# Patient Record
Sex: FEMALE | Race: WHITE | URBAN - METROPOLITAN AREA
[De-identification: names, ages, dates, MRNs, and addresses within clinical notes are randomized per-mention and may not be internally consistent; named-entity substitution may affect disease eponyms.]

---

## 2021-02-13 ENCOUNTER — IMMUNIZATION (OUTPATIENT)
Dept: PRIMARY CARE CLINIC | Age: 81
End: 2021-02-13
Payer: MEDICARE

## 2021-02-13 PROCEDURE — 91300 COVID-19, PFIZER VACCINE 30MCG/0.3ML DOSE: CPT | Performed by: INTERNAL MEDICINE

## 2021-02-13 PROCEDURE — 0001A COVID-19, PFIZER VACCINE 30MCG/0.3ML DOSE: CPT | Performed by: INTERNAL MEDICINE

## 2021-03-09 ENCOUNTER — IMMUNIZATION (OUTPATIENT)
Dept: PRIMARY CARE CLINIC | Age: 81
End: 2021-03-09
Payer: MEDICARE

## 2021-03-09 PROCEDURE — 0002A COVID-19, PFIZER VACCINE 30MCG/0.3ML DOSE: CPT | Performed by: NURSE PRACTITIONER

## 2021-03-09 PROCEDURE — 91300 COVID-19, PFIZER VACCINE 30MCG/0.3ML DOSE: CPT | Performed by: NURSE PRACTITIONER

## 2023-08-15 ENCOUNTER — HOSPITAL ENCOUNTER (OUTPATIENT)
Dept: GENERAL RADIOLOGY | Age: 83
Discharge: HOME | End: 2023-08-17
Payer: MEDICARE

## 2023-08-15 VITALS — WEIGHT: 174 LBS | BODY MASS INDEX: 32.02 KG/M2 | HEIGHT: 62 IN

## 2023-08-15 DIAGNOSIS — Z12.31 VISIT FOR SCREENING MAMMOGRAM: ICD-10-CM

## 2023-08-15 PROCEDURE — 77063 BREAST TOMOSYNTHESIS BI: CPT

## 2024-03-08 ENCOUNTER — TELEPHONE (OUTPATIENT)
Dept: CARDIOLOGY | Facility: HOSPITAL | Age: 84
End: 2024-03-08
Payer: MEDICARE

## 2024-03-08 DIAGNOSIS — I35.0 NONRHEUMATIC AORTIC VALVE STENOSIS: Primary | ICD-10-CM

## 2024-03-13 ENCOUNTER — TELEMEDICINE (OUTPATIENT)
Dept: CARDIOLOGY | Facility: HOSPITAL | Age: 84
End: 2024-03-13
Payer: MEDICARE

## 2024-03-13 DIAGNOSIS — I35.0 NONRHEUMATIC AORTIC VALVE STENOSIS: ICD-10-CM

## 2024-03-13 PROCEDURE — 99204 OFFICE O/P NEW MOD 45 MIN: CPT | Performed by: INTERNAL MEDICINE

## 2024-03-14 DIAGNOSIS — I35.0 NONRHEUMATIC AORTIC VALVE STENOSIS: Primary | ICD-10-CM

## 2024-03-15 NOTE — PROGRESS NOTES
Card[facility]:[place]       HPI: 83-year-old female with past medical history of diabetes mellitus type 2, hyperlipidemia diastolic dysfunction grade who is referred to us for aortic stenosis.  Patient patient endorses increased shortness of breath and fatigue when going up and down the steps or walking uphill which is a change compared to  past.  Also has dizziness spells at times and some chest pain, denies any falls or loss of consciousness.  She is active and walks about 2 miles few times a week.      ROS:    Constitutional: fatigue  Eyes: no acute eye problems, no blurred vision, no diplopia, no eye pain  ENT: no nosebleeds, no acute hearing loss, no earache, no sore throat  Cardiovascular: dyspnea on exertion, no chest pain  Respiratory: no chronic cough, not coughing up sputum, no wheezing that is consistent with asthma  Gastrointestinal: no acute bowel complaints  Musculoskeletal: no acute arthralgias, no acute myalgias, no acute joint swelling  Skin: no skin rashes, no change in skin color and pigmentation, no skin lesions and no skin lumps.   Neurological: no headaches, no dizziness, no tingling, no fainting and no limb weakness.   Psychiatric:  no suicidal ideation, no confusion, no personality change and no emotional problems.   Hematologic/Lymphatic: no bleeding issues.   All other systems have been reviewed and are negative for complaint.       TAVR Workup:   - NYHA: II  - Frailty: 1/5  - EKG: n/a   - TTE: 2/8/2024 EF 75%, aortic valve area 0.7 cm, mean gradient 24 mmHg peak velocity 3.06 m/s (no images available)  - CT TAVR: Pending  - LHC: Pending  - dental clearance: Goes regularly no issues last appointment 4 months ago    - STS  Procedure Type: Isolated AVR  PERIOPERATIVE OUTCOME ESTIMATE %  Operative Mortality 2.79%  Morbidity & Mortality 8.47%  Stroke 1.53%  Renal Failure 1.27%  Reoperation 3.34%  Prolonged Ventilation 3.84%  Deep Sternal Wound Infection 0.036%  Long Hospital Stay (>14 days) 5.16%  Short  Hospital Stay (<6 days)* 39%    Physical Exam:  Virtual visit           No data to display                 No current outpatient medications     Impression:   83-year-old female with past medical history of diabetes mellitus type 2, hyperlipidemia diastolic dysfunction grade who is referred to us for aortic stenosis.  Patient patient endorses increased shortness of breath and fatigue when going up and down the steps or walking uphill which is a change compared to  past.  Also has dizziness spells at times and some chest pain, denies any falls or loss of consciousness.  She is active and walks about 2 miles few times a week.    Plan:   -We will obtain images for a transthoracic echocardiogram  -Patient will need a CT TAVR  -Patient will also need a left heart cath      We discussed all the risks associated with the procedure, including but not limited to stroke, MI, pericardial tamponade, vascular complications, infection and death were discussed with the patient. The risk of needing a permament pacemaker was also discussed in detail. The patient verbalized understanding and decided to proceed with the procedure.     We will discuss this patient's case at our Valve Team meeting with representatives from Structural Heart and Cardiac Surgery. Our nurse navigators will contact patient with further diagnostic needs and formal plan.

## 2024-03-18 DIAGNOSIS — I35.0 NONRHEUMATIC AORTIC VALVE STENOSIS: Primary | ICD-10-CM

## 2024-03-18 PROBLEM — R42 DIZZINESS: Status: ACTIVE | Noted: 2024-03-18

## 2024-03-18 PROBLEM — R53.83 FATIGUE: Status: ACTIVE | Noted: 2024-03-18

## 2024-03-18 PROBLEM — R06.09 DOE (DYSPNEA ON EXERTION): Status: ACTIVE | Noted: 2024-03-18

## 2024-03-18 PROBLEM — R07.89 CHEST DISCOMFORT: Status: ACTIVE | Noted: 2024-03-18

## 2024-03-18 PROBLEM — R01.1 MURMUR, HEART: Status: ACTIVE | Noted: 2024-03-18

## 2024-03-18 RX ORDER — ROSUVASTATIN CALCIUM 20 MG/1
1 TABLET, COATED ORAL 2 TIMES WEEKLY
COMMUNITY

## 2024-03-18 RX ORDER — LEVOTHYROXINE SODIUM 88 UG/1
1 TABLET ORAL DAILY
COMMUNITY

## 2024-03-18 RX ORDER — CHOLECALCIFEROL (VITAMIN D3) 25 MCG
1000 TABLET ORAL DAILY
COMMUNITY

## 2024-03-18 RX ORDER — GLIPIZIDE AND METFORMIN HCL 5; 500 MG/1; MG/1
1 TABLET, FILM COATED ORAL 3 TIMES DAILY
COMMUNITY

## 2024-03-18 RX ORDER — EPINEPHRINE 0.22MG
100 AEROSOL WITH ADAPTER (ML) INHALATION DAILY
COMMUNITY

## 2024-03-18 RX ORDER — FAMOTIDINE 20 MG/1
20 TABLET, FILM COATED ORAL DAILY
COMMUNITY

## 2024-03-19 PROBLEM — H40.059 OCULAR HYPERTENSION: Status: ACTIVE | Noted: 2024-03-19

## 2024-03-19 PROBLEM — E11.9 TYPE 2 DIABETES MELLITUS WITHOUT COMPLICATION (MULTI): Status: ACTIVE | Noted: 2024-03-19

## 2024-03-19 PROBLEM — I35.0 NONRHEUMATIC AORTIC (VALVE) STENOSIS: Status: ACTIVE | Noted: 2024-03-13

## 2024-03-19 PROBLEM — H52.4 PRESBYOPIA: Status: ACTIVE | Noted: 2024-03-19

## 2024-03-20 ENCOUNTER — HOSPITAL ENCOUNTER (OUTPATIENT)
Dept: RADIOLOGY | Facility: HOSPITAL | Age: 84
Discharge: HOME | End: 2024-03-20
Payer: MEDICARE

## 2024-03-20 ENCOUNTER — OFFICE VISIT (OUTPATIENT)
Dept: CARDIAC SURGERY | Facility: HOSPITAL | Age: 84
End: 2024-03-20
Payer: MEDICARE

## 2024-03-20 VITALS
SYSTOLIC BLOOD PRESSURE: 106 MMHG | WEIGHT: 171 LBS | DIASTOLIC BLOOD PRESSURE: 56 MMHG | OXYGEN SATURATION: 98 % | HEART RATE: 110 BPM

## 2024-03-20 DIAGNOSIS — I35.9 AORTIC VALVE DISEASE: Primary | ICD-10-CM

## 2024-03-20 DIAGNOSIS — I35.0 NONRHEUMATIC AORTIC VALVE STENOSIS: ICD-10-CM

## 2024-03-20 PROCEDURE — 71275 CT ANGIOGRAPHY CHEST: CPT | Performed by: RADIOLOGY

## 2024-03-20 PROCEDURE — 2550000001 HC RX 255 CONTRASTS: Performed by: INTERNAL MEDICINE

## 2024-03-20 PROCEDURE — 1036F TOBACCO NON-USER: CPT | Performed by: THORACIC SURGERY (CARDIOTHORACIC VASCULAR SURGERY)

## 2024-03-20 PROCEDURE — 1159F MED LIST DOCD IN RCRD: CPT | Performed by: THORACIC SURGERY (CARDIOTHORACIC VASCULAR SURGERY)

## 2024-03-20 PROCEDURE — 99205 OFFICE O/P NEW HI 60 MIN: CPT | Performed by: THORACIC SURGERY (CARDIOTHORACIC VASCULAR SURGERY)

## 2024-03-20 PROCEDURE — 74174 CTA ABD&PLVS W/CONTRAST: CPT | Performed by: RADIOLOGY

## 2024-03-20 PROCEDURE — 74174 CTA ABD&PLVS W/CONTRAST: CPT

## 2024-03-20 PROCEDURE — 1126F AMNT PAIN NOTED NONE PRSNT: CPT | Performed by: THORACIC SURGERY (CARDIOTHORACIC VASCULAR SURGERY)

## 2024-03-20 PROCEDURE — 99215 OFFICE O/P EST HI 40 MIN: CPT | Performed by: THORACIC SURGERY (CARDIOTHORACIC VASCULAR SURGERY)

## 2024-03-20 RX ADMIN — IOHEXOL 80 ML: 350 INJECTION, SOLUTION INTRAVENOUS at 09:49

## 2024-03-20 ASSESSMENT — ENCOUNTER SYMPTOMS
LOSS OF SENSATION IN FEET: 0
DEPRESSION: 0
OCCASIONAL FEELINGS OF UNSTEADINESS: 0

## 2024-03-20 ASSESSMENT — PAIN SCALES - GENERAL: PAINLEVEL: 0-NO PAIN

## 2024-04-10 ENCOUNTER — TELEPHONE (OUTPATIENT)
Dept: CARDIOLOGY | Facility: HOSPITAL | Age: 84
End: 2024-04-10
Payer: MEDICARE

## 2024-04-10 NOTE — TELEPHONE ENCOUNTER
Called patient. Notified AVCS 472 & no valve intervention indicated at this time. Patient aware to continue to follow up with her cardiologist, Dr. Mccracken. Dr. Chaidez notified Dr. Mccracken.

## 2024-05-06 ENCOUNTER — HOSPITAL ENCOUNTER (OUTPATIENT)
Age: 84
Discharge: HOME | End: 2024-05-08

## 2024-05-06 PROCEDURE — 88305 TISSUE EXAM BY PATHOLOGIST: CPT

## 2024-05-09 LAB — SURGICAL PATHOLOGY REPORT: NORMAL

## 2025-04-01 ENCOUNTER — TELEPHONE (OUTPATIENT)
Dept: CARDIOLOGY | Facility: HOSPITAL | Age: 85
End: 2025-04-01
Payer: MEDICARE

## 2025-04-01 DIAGNOSIS — I35.0 NONRHEUMATIC AORTIC VALVE STENOSIS: Primary | ICD-10-CM

## 2025-04-02 DIAGNOSIS — I35.0 NONRHEUMATIC AORTIC VALVE STENOSIS: Primary | ICD-10-CM

## 2025-04-02 RX ORDER — METOPROLOL SUCCINATE 25 MG/1
1 TABLET, EXTENDED RELEASE ORAL
COMMUNITY
Start: 2025-01-12

## 2025-04-02 RX ORDER — ATORVASTATIN CALCIUM 10 MG/1
1 TABLET, FILM COATED ORAL
COMMUNITY
Start: 2025-03-02

## 2025-04-04 ENCOUNTER — APPOINTMENT (OUTPATIENT)
Dept: RADIOLOGY | Facility: HOSPITAL | Age: 85
End: 2025-04-04
Payer: MEDICARE

## 2025-04-04 LAB
ALBUMIN SERPL-MCNC: 4.3 G/DL (ref 3.6–5.1)
BUN SERPL-MCNC: 13 MG/DL (ref 7–25)
BUN/CREAT SERPL: 13 (CALC) (ref 6–22)
CALCIUM SERPL-MCNC: 9.4 MG/DL (ref 8.6–10.4)
CHLORIDE SERPL-SCNC: 106 MMOL/L (ref 98–110)
CO2 SERPL-SCNC: 27 MMOL/L (ref 20–32)
CREAT SERPL-MCNC: 1.01 MG/DL (ref 0.6–0.95)
EGFRCR SERPLBLD CKD-EPI 2021: 55 ML/MIN/1.73M2
ERYTHROCYTE [DISTWIDTH] IN BLOOD BY AUTOMATED COUNT: 12.4 % (ref 11–15)
GLUCOSE SERPL-MCNC: 154 MG/DL (ref 65–139)
HCT VFR BLD AUTO: 35 % (ref 35–45)
HGB BLD-MCNC: 11.7 G/DL (ref 11.7–15.5)
INR PPP: 0.9
MCH RBC QN AUTO: 29.5 PG (ref 27–33)
MCHC RBC AUTO-ENTMCNC: 33.4 G/DL (ref 32–36)
MCV RBC AUTO: 88.2 FL (ref 80–100)
PHOSPHATE SERPL-MCNC: 3.3 MG/DL (ref 2.1–4.3)
PLATELET # BLD AUTO: 200 THOUSAND/UL (ref 140–400)
PMV BLD REES-ECKER: 10.9 FL (ref 7.5–12.5)
POTASSIUM SERPL-SCNC: 4.3 MMOL/L (ref 3.5–5.3)
PROTHROMBIN TIME: 9.9 SEC (ref 9–11.5)
RBC # BLD AUTO: 3.97 MILLION/UL (ref 3.8–5.1)
SODIUM SERPL-SCNC: 140 MMOL/L (ref 135–146)
WBC # BLD AUTO: 5.7 THOUSAND/UL (ref 3.8–10.8)

## 2025-04-07 RX ORDER — BLOOD-GLUCOSE METER
EACH MISCELLANEOUS
COMMUNITY
Start: 2025-01-02

## 2025-04-09 ENCOUNTER — OFFICE VISIT (OUTPATIENT)
Dept: CARDIOLOGY | Facility: HOSPITAL | Age: 85
End: 2025-04-09
Payer: MEDICARE

## 2025-04-09 ENCOUNTER — HOSPITAL ENCOUNTER (OUTPATIENT)
Dept: RADIOLOGY | Facility: HOSPITAL | Age: 85
Discharge: HOME | End: 2025-04-09
Payer: MEDICARE

## 2025-04-09 ENCOUNTER — OFFICE VISIT (OUTPATIENT)
Dept: CARDIAC SURGERY | Facility: HOSPITAL | Age: 85
End: 2025-04-09
Payer: MEDICARE

## 2025-04-09 VITALS
DIASTOLIC BLOOD PRESSURE: 74 MMHG | SYSTOLIC BLOOD PRESSURE: 169 MMHG | WEIGHT: 170 LBS | BODY MASS INDEX: 31.28 KG/M2 | HEART RATE: 101 BPM | OXYGEN SATURATION: 97 % | HEIGHT: 62 IN

## 2025-04-09 DIAGNOSIS — I35.0 NONRHEUMATIC AORTIC VALVE STENOSIS: ICD-10-CM

## 2025-04-09 DIAGNOSIS — I35.9 AORTIC VALVE DISEASE: ICD-10-CM

## 2025-04-09 PROCEDURE — 2550000001 HC RX 255 CONTRASTS: Performed by: INTERNAL MEDICINE

## 2025-04-09 PROCEDURE — 1159F MED LIST DOCD IN RCRD: CPT | Performed by: INTERNAL MEDICINE

## 2025-04-09 PROCEDURE — 99214 OFFICE O/P EST MOD 30 MIN: CPT | Performed by: INTERNAL MEDICINE

## 2025-04-09 PROCEDURE — 71275 CT ANGIOGRAPHY CHEST: CPT

## 2025-04-09 PROCEDURE — 99215 OFFICE O/P EST HI 40 MIN: CPT | Mod: 25 | Performed by: THORACIC SURGERY (CARDIOTHORACIC VASCULAR SURGERY)

## 2025-04-09 PROCEDURE — 1126F AMNT PAIN NOTED NONE PRSNT: CPT | Performed by: INTERNAL MEDICINE

## 2025-04-09 RX ADMIN — IOHEXOL 80 ML: 350 INJECTION, SOLUTION INTRAVENOUS at 10:54

## 2025-04-09 ASSESSMENT — ENCOUNTER SYMPTOMS
LOSS OF SENSATION IN FEET: 0
DEPRESSION: 0
OCCASIONAL FEELINGS OF UNSTEADINESS: 1

## 2025-04-09 ASSESSMENT — PAIN SCALES - GENERAL: PAINLEVEL_OUTOF10: 0-NO PAIN

## 2025-04-09 NOTE — PROGRESS NOTES
PCP: See  Cards: Nawaf    HPI    84 y.o. female with PMH of HLD, DM, GERD presents for evaluation of severe, symptomatic aortic stenosis.  Evaluated by our team 1 year ago with hemodynamic and calcium score more consistent with moderate aortic stenosis. Today patient returns with echocardiographic progression of her disease and worsening of her symptoms with worsening fatigue, SOB, GIL and occasional chest discomfort going up the stair or walking uphill.  Denies overt orthopnea, PND.  Has occasional dizziness.  Denies syncope or presyncope.  Denies increased abdominal girth, edema.  Gradually doing less and less activity secondary to symptoms.    Full 14 point ROS complete and negative except as noted above.     Past Medical History:  She has a past medical history of Diabetes mellitus (Multi), Hyperlipidemia, and Hypothyroid.    Past Surgical History:  She has a past surgical history that includes Cataract extraction and Rotator cuff repair.    Echo: 50-55% EF , severe aortic stenosis, AV gradient 58/43, AV Vmax 3.8, ROBIN 0.6, mild AI   EKG: NSR, 134ms AR, left axis, 104ms QRS    TAVR Workup:   - NYHA: III  - LHC: non significant CAD  - CT TAVR: pending today  - dental clearance:  regular dentist visits q6 months, no issues.     EFT 1/5  STS   Procedure Type: Isolated AVR  Perioperative Outcome Estimate %  Operative Mortality 3.03%  Morbidity & Mortality 8.19%  Stroke 1.48%  Renal Failure 1.56%  Reoperation 3.7%  Prolonged Ventilation 4.06%  Deep Sternal Wound Infection 0.054%  Long Hospital Stay (>14 days) 5.55%  Short Hospital Stay (<6 days)* 42.8%           KCCQ Questionnaire      1  Heart failure affects different people in different ways. Some feel shortness of breath while others feel fatigue. Please indicate how much you are limited by heart failure (shortness of breath or fatigue) in your ability to do the following activities over the past 2 weeks. 1 month Structural Heart NP follow-up     A.)  Showering/bathing  5. Not at All  B.) Walking 1 block on level ground 5. Not at All  C.) Hurrying or Jogging   6. Limited for other reastons    2.  Over the past 2 weeks, how many times did you have swelling in your feet, ankles or legs when you woke up in the morning? 5. Never    3.  Over the past 2 weeks, on average, how many times has fatigue limited your ability to do what you wanted? 6. Less than once a week    4.  Over the past 2 weeks, on average, how many times has shortness of breath limited your ability to do what you wanted? 7. Never    5.  Over the past 2 weeks, on average, how many times have you been forced to sleep sitting up in a chair or with at least 3 pillows to prop you up because of shortness of breath? Never    6. Over the past 2 weeks, how much has your heart failure limited your enjoyment of life? It has not limited my enjoyment of life    7. If you had to spend the rest of your life with your heart failure the way it is right now, how would you feel about this? 3. Somewhat satisfied    8. How much does your heart failure affect your lifestyle? Please indicate how your heart failure may have limited yourparticipation in the following activities over the past 2 weeks    A.)  Hobbies, recreational activities  5. Did not limit at all    B.) Working or doing household chores  5. Did not limit at all    C.) Visiting family or friends out of your home  5. Did not limit at all      Walk test 11.6 seconds                 Social History     Tobacco Use    Smoking status: Never    Smokeless tobacco: Never   Substance Use Topics    Alcohol use: Never        No family history on file.     No Known Allergies     Current Outpatient Medications   Medication Instructions    atorvastatin (Lipitor) 10 mg tablet 1 tablet, Daily (0630)    cholecalciferol (VITAMIN D3) 1,000 Units, Daily    famotidine (PEPCID) 20 mg, Daily    glipizide-metformin (Metaglip) 5-500 mg tablet 1 tablet, 3 times daily    levothyroxine  "(Synthroid, Levoxyl) 88 mcg tablet 1 tablet, Daily    metoprolol succinate XL (Toprol-XL) 25 mg 24 hr tablet 1 tablet, Daily (0630)    OneTouch Verio test strips USE 1 TEST STRIP TO TEST BLOOD SUGAR ONCE DAILY        Vitals:    04/09/25 0944   BP: 169/74   Pulse: 101   SpO2: 97%        Physical Exam:  Constitutional: alert and in no acute distress.   Pulmonary: no increased work of breathing or signs of respiratory distress , lungs clear to auscultation.   Cardiovascular: RRR, 3/6 JAMEL RUSB,  no pitting edema,  Neurologic: non-focal neurologic examination.        Last Labs:  CBC - 4/3/2025:  9:09 AM  5.7 11.7 200    35.0      BMP  140  106  13                  ----------------<154     4.3  27  1.01     CMP - 4/3/2025:  9:09 AM  9.4 _ _ --- _   3.3 4.3 _ _      PTT - No results in last year.  0.9   9.9 _     No results found for: \"TROPHS\", \"BNP\"      Impression:   84 y.o. female with PMH of HLD, DM, GERD presents for evaluation of severe, symptomatic aortic stenosis.      Plan:   CT TAVR today     The overall decision regarding the best treatment for this condition is complex.  We discussed options of both surgical aortic valve replacement and transcatheter aortic valve replacement along with risks and benefits involved with both of them in detail.    We discussed all the risks associated with the procedure, including but not limited to stroke, MI, pericardial tamponade, vascular complications, infection and death were discussed with the patient. The risk of needing a permament pacemaker was also discussed in detail. The patient verbalized understanding and decided to proceed with the procedure.     We will discuss this patient's case at our Valve Team meeting with representatives from [facility] and Cardiac Surgery. Our nurse navigators will contact patient with further diagnostic needs and formal plan.       "

## 2025-04-09 NOTE — PROGRESS NOTES
PCP: See  Cards: Nawaf     HPI     84 y.o. female with PMH of HLD, DM, GERD presents for evaluation of severe, symptomatic aortic stenosis.  Evaluated by our team 1 year ago with hemodynamic and calcium score more consistent with moderate aortic stenosis. Today patient returns with echocardiographic progression of her disease and worsening of her symptoms with worsening fatigue, SOB, GIL and occasional chest discomfort going up the stair or walking uphill.  Denies overt orthopnea, PND.  Has occasional dizziness.  Denies syncope or presyncope.  Denies increased abdominal girth, edema.  Gradually doing less and less activity secondary to symptoms.    Full 14 point ROS complete and negative except as noted above.      Past Medical History:  She has a past medical history of Diabetes mellitus (Multi), Hyperlipidemia, and Hypothyroid.     Past Surgical History:  She has a past surgical history that includes Cataract extraction and Rotator cuff repair.     Echo: 50-55% EF , severe aortic stenosis, AV gradient 58/43, AV Vmax 3.8, ROBIN 0.6, mild AI   EKG: NSR, 134ms OK, left axis, 104ms QRS     TAVR Workup:   - NYHA: III  - LHC: non significant CAD  - CT TAVR: pending today  - dental clearance:  regular dentist visits q6 months, no issues.      EFT 1/5  STS   Procedure Type: Isolated AVR  Perioperative OutcomeEstimate %  Operative Mortality3.03%  Morbidity & Mortality8.19%  Stroke1.48%  Renal Failure1.56%  Reoperation3.7%  Prolonged Ventilation4.06%  Deep Sternal Wound Infection0.054%  Long Hospital Stay (>14 days)5.55%  Short Hospital Stay (<6 days)*42.8%                                                                    KCCQ Questionnaire        1  Heart failure affects different people in different ways. Some feel shortness of breath while others feel fatigue. Please indicate how much you are limited by heart failure (shortness of breath or fatigue) in your ability to do the following activities over the past 2 weeks.  1 month Structural Heart NP follow-up      A.) Showering/bathing  5. Not at All  B.) Walking 1 block on level ground 5. Not at All  C.) Hurrying or Jogging   6. Limited for other reastons     2.  Over the past 2 weeks, how many times did you have swelling in your feet, ankles or legs when you woke up in the morning? 5. Never     3.  Over the past 2 weeks, on average, how many times has fatigue limited your ability to do what you wanted? 6. Less than once a week     4.  Over the past 2 weeks, on average, how many times has shortness of breath limited your ability to do what you wanted? 7. Never     5.  Over the past 2 weeks, on average, how many times have you been forced to sleep sitting up in a chair or with at least 3 pillows to prop you up because of shortness of breath? Never     6. Over the past 2 weeks, how much has your heart failure limited your enjoyment of life? It has not limited my enjoyment of life     7. If you had to spend the rest of your life with your heart failure the way it is right now, how would you feel about this? 3. Somewhat satisfied     8. How much does your heart failure affect your lifestyle? Please indicate how your heart failure may have limited yourparticipation in the following activities over the past 2 weeks     A.)  Hobbies, recreational activities  5. Did not limit at all     B.) Working or doing household chores  5. Did not limit at all     C.) Visiting family or friends out of your home  5. Did not limit at all        Walk test 11.6 seconds                        Social History           Tobacco Use    Smoking status: Never    Smokeless tobacco: Never   Substance Use Topics    Alcohol use: Never         Family History   No family history on file.         RX Allergies   No Known Allergies              Current Outpatient Medications   Medication Instructions    atorvastatin (Lipitor) 10 mg tablet 1 tablet, Daily (0630)    cholecalciferol (VITAMIN D3) 1,000 Units, Daily     "famotidine (PEPCID) 20 mg, Daily    glipizide-metformin (Metaglip) 5-500 mg tablet 1 tablet, 3 times daily    levothyroxine (Synthroid, Levoxyl) 88 mcg tablet 1 tablet, Daily    metoprolol succinate XL (Toprol-XL) 25 mg 24 hr tablet 1 tablet, Daily (0630)    OneTouch Verio test strips USE 1 TEST STRIP TO TEST BLOOD SUGAR ONCE DAILY             Vitals:     04/09/25 0944   BP: 169/74   Pulse: 101   SpO2: 97%         Physical Exam:  Constitutional: alert and in no acute distress.   Pulmonary: no increased work of breathing or signs of respiratory distress , lungs clear to auscultation.   Cardiovascular: RRR, 3/6 JAMEL RUSB,  no pitting edema,  Neurologic: non-focal neurologic examination.          Last Labs:  CBC - 4/3/2025:  9:09 AM  5.7 11.7 200    35.0       BMP  140  106  13                               ----------------<154                    4.3  27  1.01        CMP - 4/3/2025:  9:09 AM  9.4 _ _ --- _   3.3 4.3 _ _       PTT - No results in last year.      0.9   9.9 _      No results found for: \"TROPHS\", \"BNP\"       Impression:   84 y.o. female with PMH of HLD, DM, GERD presents for evaluation of severe, symptomatic aortic stenosis.       Plan:   CT TAVR today      The overall decision regarding the best treatment for this condition is complex.  We discussed options of both surgical aortic valve replacement and transcatheter aortic valve replacement along with risks and benefits involved with both of them in detail.     We discussed all the risks associated with the procedure, including but not limited to stroke, MI, pericardial tamponade, vascular complications, infection and death were discussed with the patient. The risk of needing a permament pacemaker was also discussed in detail. The patient verbalized understanding and decided to proceed with the procedure.      We will discuss this patient's case at our Valve Team meeting with representatives from [facility] and Cardiac Surgery. Our nurse " navigators will contact patient with further diagnostic needs and formal plan.

## 2025-04-10 NOTE — PROGRESS NOTES
KCCQ Questionnaire      1  Heart failure affects different people in different ways. Some feel shortness of breath while others feel fatigue. Please indicate how much you are limited by heart failure (shortness of breath or fatigue) in your ability to do the following activities over the past 2 weeks. PRE PROCEDURE    A.) Showering/bathing  5. Not at All  B.) Walking 1 block on level ground 5. Not at All  C.) Hurrying or Jogging   6. Limited for other reastons    2.  Over the past 2 weeks, how many times did you have swelling in your feet, ankles or legs when you woke up in the morning? 4. Less than once a week    3.  Over the past 2 weeks, on average, how many times has fatigue limited your ability to do what you wanted? 6. Less than once a week    4.  Over the past 2 weeks, on average, how many times has shortness of breath limited your ability to do what you wanted? 7. Never    5.  Over the past 2 weeks, on average, how many times have you been forced to sleep sitting up in a chair or with at least 3 pillows to prop you up because of shortness of breath? Never    6. Over the past 2 weeks, how much has your heart failure limited your enjoyment of life? It has not limited my enjoyment of life    7. If you had to spend the rest of your life with your heart failure the way it is right now, how would you feel about this? 3. Somewhat satisfied    8. How much does your heart failure affect your lifestyle? Please indicate how your heart failure may have limited yourparticipation in the following activities over the past 2 weeks    A.)  Hobbies, recreational activities  5. Did not limit at all    B.) Working or doing household chores  5. Did not limit at all    C.) Visiting family or friends out of your home  5. Did not limit at all    5 Meter Walk_____11.6_____seconds

## 2025-04-14 ENCOUNTER — CARDIOLOGY CONFERENCE (OUTPATIENT)
Dept: CARDIOLOGY | Facility: HOSPITAL | Age: 85
End: 2025-04-14
Payer: MEDICARE

## 2025-04-15 ENCOUNTER — TELEPHONE (OUTPATIENT)
Dept: CARDIOLOGY | Facility: HOSPITAL | Age: 85
End: 2025-04-15
Payer: MEDICARE

## 2025-04-15 DIAGNOSIS — I35.0 NONRHEUMATIC AORTIC VALVE STENOSIS: Primary | ICD-10-CM

## 2025-04-16 PROBLEM — I35.0 NONRHEUMATIC AORTIC VALVE STENOSIS: Status: ACTIVE | Noted: 2025-04-15

## 2025-04-16 NOTE — PROGRESS NOTES
Valve and Structural Heart Multidisciplinary Meeting   This note reflects a summary of a case conference discussion between a multidisciplinary team of interventional cardiologists, cardiac surgeons, APPs, nurse navigators, and research team.  The suggestions and impressions in this note reflect group consensus opinion but do not substitute bedside evaluation and management by the primary team.     Attendees:  Danie Riggs,  Dr. Chaidez,  Dr. Ross,  Nhi Deras,  Dr. John, Freddy Roque , Dr. Biggs, Dr. Herrera, Patite Lopez, Esther Jauregui, Marti Tripathi, Jenni Bloom, Dr. Renteria, Dr. Mathew, Dr. Fernandez, Vidya Cox    Zoie Obregon is a 84 y.o. year old female  Medical record number: [id number]    Referring Provider Dr. Mccracken    Brief Clinical Summary - clinical presentation/comorbidities:  84 y.o. female with PMH of HLD, DM, GERD presents for evaluation of severe, symptomatic aortic stenosis.  Evaluated by our team 1 year ago with hemodynamic and calcium score more consistent with moderate aortic stenosis. Today patient returns with echocardiographic progression of her disease and worsening of her symptoms with worsening fatigue, SOB, GIL and occasional chest discomfort going up the stair or walking uphill.  Denies overt orthopnea, PND.  Has occasional dizziness.    Valve and Structural Heart Work Up  Echo: 50-55% EF , severe aortic stenosis, AV gradient 58/43, AV Vmax 3.8, ROBIN 0.6, mild AI   EKG: NSR, 134ms SC, left axis, 104ms QRS   - NYHA: III  - LHC: non significant CAD  - CT TAVR: 4/9/2025 reviewed by KONRAD ok to proceed.   - dental clearance:  regular dentist visits q6 months, no issues.    EFT 1/5  STS  Procedure Type: Isolated AVR  Perioperative OutcomeEstimate %  Operative Mortality3.03%  KCCQ/Walk done    Group consensus recommendation:   Patient does not need repeat cardiac catheterization. Patient now meets criteria to treat. CT read: alexus 69 area 354 sinus 28  STJ 23   Coronary  take off is high.  Evolut 26 FXplus. Access good. Potential MRI study. Proceed with TAVR.   To contact the  [facility] contact  [facility] or the office [phone].

## 2025-04-21 PROBLEM — Z95.2 S/P TAVR (TRANSCATHETER AORTIC VALVE REPLACEMENT): Status: ACTIVE | Noted: 2025-04-21

## 2025-04-21 NOTE — DISCHARGE INSTRUCTIONS
####  POST VALVE PROCEDURE DISCHARGE INSTRUCTIONS   ####    - Please weigh yourself daily (first thing in the morning) and record reading  - Please take and record your blood pressure 2 times a day (at least 60-90 mins AFTER you take your meds)  PLEASE HAVE THESE READINGS AVAILABLE DURING YOUR FOLLOW-UP APPOINTMENTS!!    - NO DRIVING OR LIFTING/PULLING/PUSHING GREATER THAN 10 POUNDS FOR 1 WEEK FROM YOUR PROCEDURE DATE.    - A lab requisition has been provided for you to draw a CBC and BMP.  Please take this rec to your local lab to have your labs drawn between 4-7 days post discharge.     - You have been given the order requisition for the 1 month ECHO at the time of your discharge.  Please call and schedule this ECHO at your LOCAL center (no sooner than 23 days after your procedure date).    - You will have 2 (possibly 3 - if 1 week appointment available) appointments that are vital to your post procedure care, these will be scheduled for you IF YOU NEED TO CHANGE YOUR APPOINTMENT TIME/DATE, PLEASE CALL [phone]   - Please follow up with your PCP within 7-14 days of discharge  - You will follow up with your primary cardiologist in 6-10 weeks    **** No elective dental procedures or cleanings for 3 months post procedure - You will need dental prophylaxis (oral antibiotic) prior to dental work/cleanings for life *****    Please call the STRUCTURAL HEART TEAM LINE if you have any questions or concerns - [phone] (M-F 9am-4pm)  On-Call Cardiology Fellow: [phone] (ONLY for urgent issues on nights/weekends/holidays)      ****   CALL PROVIDER IF:   ****  - Breathing faster than normal.   - Breathing harder than normal or having retractions.   - Fever of 100.4 F (38 C) or higher.   - Chills  - Drinking less than normal.   - Urinating less than normal, over 1 day.   - Acting very sleepy and difficult to awaken.   - Vomiting (throwing up) and not able to eat or drink for 12 hours.   - 3 or more loose, watery  bowel movements in 24 hours (diarrhea).    -Any new concerning symptoms.    - If you develop difficulty breathing, rash, hives, severe nausea, vomiting, light-headedness or any signs of infection, immediately contact your doctor and go to the nearest emergency room.      #####   MISC. HOME-GOING INFO   #####  - DO NOT drink any alcoholic drinks or take any non-prescriptive medications that contain alcohol for the first 24 hours.   - DO NOT make any important decisions for the first 24 hours.      ACTIVITY:  - You are advised to go directly home from the hospital.   - DO NOT lift anything heavier than 10 pounds for one week, this allows for proper healing of the groin.   - No excessive exercise or treadmill use for one week. You may walk and do stairs, slowly.   - No sexual activities for 24 hours after you arrive home.      WOUND CARE:  - If slight bleeding should occur at site, lie down and have someone apply firm pressure just above the puncture site for 5 minutes.  If it continues or is profuse, call 911. Always notify your doctor if bleeding occurs.   - Keep site clean and dry. Let air dry or you may use a simple bandaid.   - Gently cleanse the puncture site in your groin with soap and water only.   - You may experience some tenderness, bruising or minimal inflammation.  If you have any concerns, you may contact the Cath Lab or if any of these symptoms become excessive, contact your cardiologist or go to the emergency room.   - No tub baths, soaking, or swimming for one week.   - May shower the next day after your procedure.      DIET:  - You may resume your normal diet. However, be mindful of your sodium intake.  Ideally you should try to limit your daily intake of sodium to 2-3g a day      HEART FAILURE SPECIFIC INSTRUCTIONS:   - CALL 911 IF YOU HAVE ANY OF THE SIGNS AND SYMPTOMS OF HEART FAILURE:   1. Chest pain   2. Significant Shortness of breath   3. Fainting.   - Notify your physician immediately if you  have shortness of breath; weight gain of 3 lbs. or more; fatigue and loss of energy; swelling of lower extremities or abdomen; dizziness or fainting; change of appetite; and frequent coughing.   - Daily weight on the same scale, same time after voiding and before eating.   - Maintain daily weight log.

## 2025-04-23 NOTE — PROGRESS NOTES
Pharmacy Medication History Review    Zoie Obregon is a 84 y.o. female who is planned to be admitted for Nonrheumatic aortic valve stenosis. Pharmacy called the patient prior to their scheduled procedure and reviewed the patient's [id number] medications for accuracy.    Medications ADDED:  None   Medications CHANGED:  none  Medications REMOVED:   none    Please review updated prior to admission medication list and comments regarding how patient may be taking medications differently by going to Admission tab --> Admission Orders --> Admit Orders / Review prior to admission medications.     Preferred pharmacy, last doses of medications, and allergies to be confirmed with patient by nursing the day of procedure.     Sources used to complete the med history include:  [facility]  Pharmacy dispense history  Patient Interview Good historian  Chart Review  Care Everywhere     Below are additional concerns with the patient's PTA list.  Patient reported only taking levothyroxine 6 days per week, NOT taking on Sundays. She reported this is a long standing directions (was NOT a recent dose change). Last filled 1/3/25 for #90/90days.     Sherrell Berry, PharmD   Please reach out via Secure Chat for questions

## 2025-04-24 ENCOUNTER — TELEPHONE (OUTPATIENT)
Dept: CARDIOLOGY | Facility: HOSPITAL | Age: 85
End: 2025-04-24

## 2025-04-25 ENCOUNTER — APPOINTMENT (OUTPATIENT)
Dept: RADIOLOGY | Facility: HOSPITAL | Age: 85
DRG: 267 | End: 2025-04-25
Payer: MEDICARE

## 2025-04-25 ENCOUNTER — HOSPITAL ENCOUNTER (INPATIENT)
Facility: HOSPITAL | Age: 85
LOS: 1 days | Discharge: HOME | End: 2025-04-26
Attending: INTERNAL MEDICINE | Admitting: INTERNAL MEDICINE
Payer: MEDICARE

## 2025-04-25 ENCOUNTER — APPOINTMENT (OUTPATIENT)
Dept: CARDIOLOGY | Facility: HOSPITAL | Age: 85
DRG: 267 | End: 2025-04-25
Payer: MEDICARE

## 2025-04-25 DIAGNOSIS — Z95.2 S/P TAVR (TRANSCATHETER AORTIC VALVE REPLACEMENT): ICD-10-CM

## 2025-04-25 DIAGNOSIS — I50.43 ACUTE ON CHRONIC COMBINED SYSTOLIC (CONGESTIVE) AND DIASTOLIC (CONGESTIVE) HEART FAILURE: ICD-10-CM

## 2025-04-25 DIAGNOSIS — I35.0 NONRHEUMATIC AORTIC VALVE STENOSIS: Primary | ICD-10-CM

## 2025-04-25 LAB
ABO GROUP (TYPE) IN BLOOD: NORMAL
ANION GAP SERPL CALC-SCNC: 11 MMOL/L (ref 10–20)
ANTIBODY SCREEN: NORMAL
AORTIC VALVE MEAN GRADIENT: 39 MMHG
AORTIC VALVE PEAK VELOCITY: 4.03 M/S
AV PEAK GRADIENT: 65 MMHG
AVA (PEAK VEL): 0.54 CM2
AVA (VTI): 0.55 CM2
BUN SERPL-MCNC: 12 MG/DL (ref 6–23)
CALCIUM SERPL-MCNC: 8.6 MG/DL (ref 8.6–10.6)
CHLORIDE SERPL-SCNC: 107 MMOL/L (ref 98–107)
CO2 SERPL-SCNC: 25 MMOL/L (ref 21–32)
CREAT SERPL-MCNC: 0.96 MG/DL (ref 0.5–1.05)
EGFRCR SERPLBLD CKD-EPI 2021: 59 ML/MIN/1.73M*2
EJECTION FRACTION APICAL 4 CHAMBER: 55.9
EJECTION FRACTION: 59 %
ERYTHROCYTE [DISTWIDTH] IN BLOOD BY AUTOMATED COUNT: 13.1 % (ref 11.5–14.5)
GLUCOSE BLD MANUAL STRIP-MCNC: 195 MG/DL (ref 74–99)
GLUCOSE BLD MANUAL STRIP-MCNC: 235 MG/DL (ref 74–99)
GLUCOSE SERPL-MCNC: 163 MG/DL (ref 74–99)
HCT VFR BLD AUTO: 30.9 % (ref 36–46)
HGB BLD-MCNC: 9.9 G/DL (ref 12–16)
INR PPP: 1.2 (ref 0.9–1.1)
LEFT VENTRICLE INTERNAL DIMENSION DIASTOLE: 3.2 CM (ref 3.5–6)
LEFT VENTRICULAR OUTFLOW TRACT DIAMETER: 1.83 CM
MCH RBC QN AUTO: 29 PG (ref 26–34)
MCHC RBC AUTO-ENTMCNC: 32 G/DL (ref 32–36)
MCV RBC AUTO: 91 FL (ref 80–100)
NRBC BLD-RTO: 0 /100 WBCS (ref 0–0)
PLATELET # BLD AUTO: 154 X10*3/UL (ref 150–450)
POTASSIUM SERPL-SCNC: 4.1 MMOL/L (ref 3.5–5.3)
PROTHROMBIN TIME: 12.9 SECONDS (ref 9.8–12.4)
RBC # BLD AUTO: 3.41 X10*6/UL (ref 4–5.2)
RH FACTOR (ANTIGEN D): NORMAL
SODIUM SERPL-SCNC: 139 MMOL/L (ref 136–145)
WBC # BLD AUTO: 4.8 X10*3/UL (ref 4.4–11.3)

## 2025-04-25 PROCEDURE — 85610 PROTHROMBIN TIME: CPT | Performed by: INTERNAL MEDICINE

## 2025-04-25 PROCEDURE — 71045 X-RAY EXAM CHEST 1 VIEW: CPT

## 2025-04-25 PROCEDURE — 99153 MOD SED SAME PHYS/QHP EA: CPT | Performed by: INTERNAL MEDICINE

## 2025-04-25 PROCEDURE — 93005 ELECTROCARDIOGRAM TRACING: CPT

## 2025-04-25 PROCEDURE — C1725 CATH, TRANSLUMIN NON-LASER: HCPCS | Performed by: INTERNAL MEDICINE

## 2025-04-25 PROCEDURE — 86850 RBC ANTIBODY SCREEN: CPT | Performed by: NURSE PRACTITIONER

## 2025-04-25 PROCEDURE — 93321 DOPPLER ECHO F-UP/LMTD STD: CPT | Performed by: INTERNAL MEDICINE

## 2025-04-25 PROCEDURE — 82947 ASSAY GLUCOSE BLOOD QUANT: CPT

## 2025-04-25 PROCEDURE — 02RF38Z REPLACEMENT OF AORTIC VALVE WITH ZOOPLASTIC TISSUE, PERCUTANEOUS APPROACH: ICD-10-PCS | Performed by: STUDENT IN AN ORGANIZED HEALTH CARE EDUCATION/TRAINING PROGRAM

## 2025-04-25 PROCEDURE — 36415 COLL VENOUS BLD VENIPUNCTURE: CPT | Performed by: NURSE PRACTITIONER

## 2025-04-25 PROCEDURE — 2780000003 HC OR 278 NO HCPCS: Performed by: INTERNAL MEDICINE

## 2025-04-25 PROCEDURE — 93010 ELECTROCARDIOGRAM REPORT: CPT | Performed by: INTERNAL MEDICINE

## 2025-04-25 PROCEDURE — 33361 REPLACE AORTIC VALVE PERQ: CPT | Performed by: INTERNAL MEDICINE

## 2025-04-25 PROCEDURE — 2550000001 HC RX 255 CONTRASTS: Mod: JW | Performed by: INTERNAL MEDICINE

## 2025-04-25 PROCEDURE — C1760 CLOSURE DEV, VASC: HCPCS | Performed by: INTERNAL MEDICINE

## 2025-04-25 PROCEDURE — C1769 GUIDE WIRE: HCPCS | Performed by: INTERNAL MEDICINE

## 2025-04-25 PROCEDURE — 2500000001 HC RX 250 WO HCPCS SELF ADMINISTERED DRUGS (ALT 637 FOR MEDICARE OP): Performed by: NURSE PRACTITIONER

## 2025-04-25 PROCEDURE — C1894 INTRO/SHEATH, NON-LASER: HCPCS | Performed by: INTERNAL MEDICINE

## 2025-04-25 PROCEDURE — 80048 BASIC METABOLIC PNL TOTAL CA: CPT | Performed by: INTERNAL MEDICINE

## 2025-04-25 PROCEDURE — 86901 BLOOD TYPING SEROLOGIC RH(D): CPT | Performed by: NURSE PRACTITIONER

## 2025-04-25 PROCEDURE — 93325 DOPPLER ECHO COLOR FLOW MAPG: CPT | Performed by: INTERNAL MEDICINE

## 2025-04-25 PROCEDURE — C1756 CATH, PACING, TRANSESOPH: HCPCS | Performed by: INTERNAL MEDICINE

## 2025-04-25 PROCEDURE — 2720000007 HC OR 272 NO HCPCS: Performed by: INTERNAL MEDICINE

## 2025-04-25 PROCEDURE — 85347 COAGULATION TIME ACTIVATED: CPT | Performed by: INTERNAL MEDICINE

## 2025-04-25 PROCEDURE — C1889 IMPLANT/INSERT DEVICE, NOC: HCPCS | Performed by: INTERNAL MEDICINE

## 2025-04-25 PROCEDURE — 1200000002 HC GENERAL ROOM WITH TELEMETRY DAILY

## 2025-04-25 PROCEDURE — G0269 OCCLUSIVE DEVICE IN VEIN ART: HCPCS | Performed by: INTERNAL MEDICINE

## 2025-04-25 PROCEDURE — 93308 TTE F-UP OR LMTD: CPT | Performed by: INTERNAL MEDICINE

## 2025-04-25 PROCEDURE — 33361 REPLACE AORTIC VALVE PERQ: CPT | Performed by: STUDENT IN AN ORGANIZED HEALTH CARE EDUCATION/TRAINING PROGRAM

## 2025-04-25 PROCEDURE — 85027 COMPLETE CBC AUTOMATED: CPT | Performed by: INTERNAL MEDICINE

## 2025-04-25 PROCEDURE — 93325 DOPPLER ECHO COLOR FLOW MAPG: CPT

## 2025-04-25 PROCEDURE — 99152 MOD SED SAME PHYS/QHP 5/>YRS: CPT | Performed by: INTERNAL MEDICINE

## 2025-04-25 PROCEDURE — 2500000004 HC RX 250 GENERAL PHARMACY W/ HCPCS (ALT 636 FOR OP/ED): Performed by: INTERNAL MEDICINE

## 2025-04-25 PROCEDURE — 76937 US GUIDE VASCULAR ACCESS: CPT | Performed by: INTERNAL MEDICINE

## 2025-04-25 DEVICE — VALVE, AORTIC, 26MM, EVOLUT FX  TRANSCATHETER: Type: IMPLANTABLE DEVICE | Site: HEART | Status: FUNCTIONAL

## 2025-04-25 DEVICE — FLOW DIRECTED PACING CATHETER
Type: IMPLANTABLE DEVICE | Site: HEART | Status: NON-FUNCTIONAL
Brand: PACEL™

## 2025-04-25 DEVICE — LOADING SYS L-EVOLUTFX-2329
Type: IMPLANTABLE DEVICE | Site: HEART | Status: NON-FUNCTIONAL
Brand: EVOLUT™ FX

## 2025-04-25 RX ORDER — ONDANSETRON HYDROCHLORIDE 2 MG/ML
4 INJECTION, SOLUTION INTRAVENOUS EVERY 8 HOURS PRN
Status: DISCONTINUED | OUTPATIENT
Start: 2025-04-25 | End: 2025-04-26 | Stop reason: HOSPADM

## 2025-04-25 RX ORDER — PROCHLORPERAZINE 25 MG/1
25 SUPPOSITORY RECTAL EVERY 12 HOURS PRN
Status: DISCONTINUED | OUTPATIENT
Start: 2025-04-25 | End: 2025-04-26 | Stop reason: HOSPADM

## 2025-04-25 RX ORDER — PANTOPRAZOLE SODIUM 40 MG/1
40 TABLET, DELAYED RELEASE ORAL
Status: DISCONTINUED | OUTPATIENT
Start: 2025-04-26 | End: 2025-04-26 | Stop reason: HOSPADM

## 2025-04-25 RX ORDER — ACETAMINOPHEN 650 MG/1
650 SUPPOSITORY RECTAL EVERY 6 HOURS PRN
Status: DISCONTINUED | OUTPATIENT
Start: 2025-04-25 | End: 2025-04-26 | Stop reason: HOSPADM

## 2025-04-25 RX ORDER — LIDOCAINE HYDROCHLORIDE 10 MG/ML
INJECTION, SOLUTION EPIDURAL; INFILTRATION; INTRACAUDAL; PERINEURAL AS NEEDED
Status: DISCONTINUED | OUTPATIENT
Start: 2025-04-25 | End: 2025-04-25 | Stop reason: HOSPADM

## 2025-04-25 RX ORDER — DOCUSATE SODIUM 100 MG/1
100 CAPSULE, LIQUID FILLED ORAL 2 TIMES DAILY
Status: DISCONTINUED | OUTPATIENT
Start: 2025-04-25 | End: 2025-04-26 | Stop reason: HOSPADM

## 2025-04-25 RX ORDER — PROCHLORPERAZINE EDISYLATE 5 MG/ML
10 INJECTION INTRAMUSCULAR; INTRAVENOUS EVERY 6 HOURS PRN
Status: DISCONTINUED | OUTPATIENT
Start: 2025-04-25 | End: 2025-04-26 | Stop reason: HOSPADM

## 2025-04-25 RX ORDER — FUROSEMIDE 10 MG/ML
INJECTION INTRAMUSCULAR; INTRAVENOUS AS NEEDED
Status: DISCONTINUED | OUTPATIENT
Start: 2025-04-25 | End: 2025-04-25 | Stop reason: HOSPADM

## 2025-04-25 RX ORDER — CEFAZOLIN SODIUM 2 G/50ML
SOLUTION INTRAVENOUS CONTINUOUS PRN
Status: COMPLETED | OUTPATIENT
Start: 2025-04-25 | End: 2025-04-25

## 2025-04-25 RX ORDER — TRAMADOL HYDROCHLORIDE 50 MG/1
50 TABLET ORAL EVERY 6 HOURS PRN
Status: DISCONTINUED | OUTPATIENT
Start: 2025-04-25 | End: 2025-04-26 | Stop reason: HOSPADM

## 2025-04-25 RX ORDER — ONDANSETRON 4 MG/1
4 TABLET, FILM COATED ORAL EVERY 8 HOURS PRN
Status: DISCONTINUED | OUTPATIENT
Start: 2025-04-25 | End: 2025-04-26 | Stop reason: HOSPADM

## 2025-04-25 RX ORDER — LIDOCAINE HYDROCHLORIDE AND EPINEPHRINE 10; 20 UG/ML; MG/ML
5 INJECTION, SOLUTION INFILTRATION; PERINEURAL ONCE AS NEEDED
Status: DISCONTINUED | OUTPATIENT
Start: 2025-04-25 | End: 2025-04-26 | Stop reason: HOSPADM

## 2025-04-25 RX ORDER — LOSARTAN POTASSIUM 25 MG/1
25 TABLET ORAL DAILY
Status: DISCONTINUED | OUTPATIENT
Start: 2025-04-25 | End: 2025-04-26 | Stop reason: HOSPADM

## 2025-04-25 RX ORDER — ASPIRIN 81 MG/1
81 TABLET ORAL DAILY
Status: DISCONTINUED | OUTPATIENT
Start: 2025-04-25 | End: 2025-04-26 | Stop reason: HOSPADM

## 2025-04-25 RX ORDER — PANTOPRAZOLE SODIUM 40 MG/10ML
40 INJECTION, POWDER, LYOPHILIZED, FOR SOLUTION INTRAVENOUS
Status: DISCONTINUED | OUTPATIENT
Start: 2025-04-26 | End: 2025-04-26 | Stop reason: HOSPADM

## 2025-04-25 RX ORDER — ATORVASTATIN CALCIUM 10 MG/1
10 TABLET, FILM COATED ORAL
Status: DISCONTINUED | OUTPATIENT
Start: 2025-04-25 | End: 2025-04-26 | Stop reason: HOSPADM

## 2025-04-25 RX ORDER — HEPARIN SODIUM 1000 [USP'U]/ML
INJECTION, SOLUTION INTRAVENOUS; SUBCUTANEOUS AS NEEDED
Status: DISCONTINUED | OUTPATIENT
Start: 2025-04-25 | End: 2025-04-25 | Stop reason: HOSPADM

## 2025-04-25 RX ORDER — LEVOTHYROXINE SODIUM 88 UG/1
88 TABLET ORAL DAILY
Status: DISCONTINUED | OUTPATIENT
Start: 2025-04-26 | End: 2025-04-26 | Stop reason: HOSPADM

## 2025-04-25 RX ORDER — PROTAMINE SULFATE 10 MG/ML
INJECTION, SOLUTION INTRAVENOUS CONTINUOUS PRN
Status: COMPLETED | OUTPATIENT
Start: 2025-04-25 | End: 2025-04-25

## 2025-04-25 RX ORDER — SODIUM CHLORIDE 9 MG/ML
INJECTION, SOLUTION INTRAVENOUS CONTINUOUS PRN
Status: COMPLETED | OUTPATIENT
Start: 2025-04-25 | End: 2025-04-25

## 2025-04-25 RX ORDER — ACETAMINOPHEN 160 MG/5ML
650 SOLUTION ORAL EVERY 6 HOURS PRN
Status: DISCONTINUED | OUTPATIENT
Start: 2025-04-25 | End: 2025-04-26 | Stop reason: HOSPADM

## 2025-04-25 RX ORDER — FENTANYL CITRATE 50 UG/ML
INJECTION, SOLUTION INTRAMUSCULAR; INTRAVENOUS AS NEEDED
Status: DISCONTINUED | OUTPATIENT
Start: 2025-04-25 | End: 2025-04-25 | Stop reason: HOSPADM

## 2025-04-25 RX ORDER — ACETAMINOPHEN 325 MG/1
650 TABLET ORAL EVERY 6 HOURS PRN
Status: DISCONTINUED | OUTPATIENT
Start: 2025-04-25 | End: 2025-04-26 | Stop reason: HOSPADM

## 2025-04-25 RX ORDER — PROCHLORPERAZINE MALEATE 10 MG
10 TABLET ORAL EVERY 6 HOURS PRN
Status: DISCONTINUED | OUTPATIENT
Start: 2025-04-25 | End: 2025-04-26 | Stop reason: HOSPADM

## 2025-04-25 RX ORDER — MIDAZOLAM HYDROCHLORIDE 1 MG/ML
INJECTION, SOLUTION INTRAMUSCULAR; INTRAVENOUS AS NEEDED
Status: DISCONTINUED | OUTPATIENT
Start: 2025-04-25 | End: 2025-04-25 | Stop reason: HOSPADM

## 2025-04-25 RX ADMIN — ASPIRIN 81 MG: 81 TABLET, COATED ORAL at 15:07

## 2025-04-25 RX ADMIN — LOSARTAN POTASSIUM 25 MG: 25 TABLET, FILM COATED ORAL at 15:07

## 2025-04-25 SDOH — SOCIAL STABILITY: SOCIAL INSECURITY: ARE YOU OR HAVE YOU BEEN THREATENED OR ABUSED PHYSICALLY, EMOTIONALLY, OR SEXUALLY BY ANYONE?: NO

## 2025-04-25 SDOH — SOCIAL STABILITY: SOCIAL INSECURITY: DOES ANYONE TRY TO KEEP YOU FROM HAVING/CONTACTING OTHER FRIENDS OR DOING THINGS OUTSIDE YOUR HOME?: NO

## 2025-04-25 SDOH — SOCIAL STABILITY: SOCIAL INSECURITY: DO YOU FEEL ANYONE HAS EXPLOITED OR TAKEN ADVANTAGE OF YOU FINANCIALLY OR OF YOUR PERSONAL PROPERTY?: NO

## 2025-04-25 SDOH — ECONOMIC STABILITY: HOUSING INSECURITY: DO YOU FEEL UNSAFE GOING BACK TO THE PLACE WHERE YOU LIVE?: NO

## 2025-04-25 SDOH — SOCIAL STABILITY: SOCIAL INSECURITY: ABUSE: ADULT

## 2025-04-25 SDOH — SOCIAL STABILITY: SOCIAL INSECURITY: HAVE YOU HAD ANY THOUGHTS OF HARMING ANYONE ELSE?: NO

## 2025-04-25 SDOH — SOCIAL STABILITY: SOCIAL INSECURITY: ARE THERE ANY APPARENT SIGNS OF INJURIES/BEHAVIORS THAT COULD BE RELATED TO ABUSE/NEGLECT?: NO

## 2025-04-25 SDOH — SOCIAL STABILITY: SOCIAL INSECURITY: HAS ANYONE EVER THREATENED TO HURT YOUR FAMILY OR YOUR PETS?: NO

## 2025-04-25 SDOH — SOCIAL STABILITY: SOCIAL INSECURITY: DO YOU FEEL UNSAFE GOING BACK TO THE PLACE WHERE YOU ARE LIVING?: NO

## 2025-04-25 SDOH — SOCIAL STABILITY: SOCIAL INSECURITY: WERE YOU ABLE TO COMPLETE ALL THE BEHAVIORAL HEALTH SCREENINGS?: YES

## 2025-04-25 SDOH — SOCIAL STABILITY: SOCIAL INSECURITY: HAVE YOU HAD THOUGHTS OF HARMING ANYONE ELSE?: NO

## 2025-04-25 ASSESSMENT — PAIN - FUNCTIONAL ASSESSMENT
PAIN_FUNCTIONAL_ASSESSMENT: 0-10
PAIN_FUNCTIONAL_ASSESSMENT: 0-10

## 2025-04-25 ASSESSMENT — PAIN SCALES - GENERAL
PAINLEVEL_OUTOF10: 0 - NO PAIN

## 2025-04-25 ASSESSMENT — COGNITIVE AND FUNCTIONAL STATUS - GENERAL
PATIENT BASELINE BEDBOUND: NO
MOBILITY SCORE: 24
DAILY ACTIVITIY SCORE: 24
DAILY ACTIVITIY SCORE: 24
CLIMB 3 TO 5 STEPS WITH RAILING: A LITTLE
MOBILITY SCORE: 23

## 2025-04-25 ASSESSMENT — ACTIVITIES OF DAILY LIVING (ADL)
DRESSING YOURSELF: INDEPENDENT
JUDGMENT_ADEQUATE_SAFELY_COMPLETE_DAILY_ACTIVITIES: YES
ASSISTIVE_DEVICE: EYEGLASSES;DENTURES UPPER
WALKS IN HOME: INDEPENDENT
TOILETING: INDEPENDENT
GROOMING: INDEPENDENT
HEARING - LEFT EAR: FUNCTIONAL
BATHING: INDEPENDENT
PATIENT'S MEMORY ADEQUATE TO SAFELY COMPLETE DAILY ACTIVITIES?: YES
HEARING - RIGHT EAR: FUNCTIONAL
FEEDING YOURSELF: INDEPENDENT
ADEQUATE_TO_COMPLETE_ADL: YES

## 2025-04-25 ASSESSMENT — PATIENT HEALTH QUESTIONNAIRE - PHQ9
SUM OF ALL RESPONSES TO PHQ9 QUESTIONS 1 & 2: 0
1. LITTLE INTEREST OR PLEASURE IN DOING THINGS: NOT AT ALL
2. FEELING DOWN, DEPRESSED OR HOPELESS: NOT AT ALL

## 2025-04-25 ASSESSMENT — COLUMBIA-SUICIDE SEVERITY RATING SCALE - C-SSRS
1. IN THE PAST MONTH, HAVE YOU WISHED YOU WERE DEAD OR WISHED YOU COULD GO TO SLEEP AND NOT WAKE UP?: NO
6. HAVE YOU EVER DONE ANYTHING, STARTED TO DO ANYTHING, OR PREPARED TO DO ANYTHING TO END YOUR LIFE?: NO
2. HAVE YOU ACTUALLY HAD ANY THOUGHTS OF KILLING YOURSELF?: NO

## 2025-04-25 ASSESSMENT — LIFESTYLE VARIABLES
AUDIT-C TOTAL SCORE: 3
HOW MANY STANDARD DRINKS CONTAINING ALCOHOL DO YOU HAVE ON A TYPICAL DAY: 1 OR 2
SKIP TO QUESTIONS 9-10: 1
HOW OFTEN DO YOU HAVE A DRINK CONTAINING ALCOHOL: 2-3 TIMES A WEEK
AUDIT-C TOTAL SCORE: 3
HOW OFTEN DO YOU HAVE 6 OR MORE DRINKS ON ONE OCCASION: NEVER

## 2025-04-25 NOTE — H&P
History Of Present Illness  84 y.o. female with PMH of HLD, DM, GERD presents for evaluation of severe, symptomatic aortic stenosis.  Evaluated by our team 1 year ago with hemodynamic and calcium score more consistent with moderate aortic stenosis. Today patient returns with echocardiographic progression of her disease and worsening of her symptoms with worsening fatigue, SOB, GIL and occasional chest discomfort going up the stair or walking uphill.  Denies overt orthopnea, PND.  Has occasional dizziness.     Valve and Structural Heart Work Up  Echo: 50-55% EF , severe aortic stenosis, AV gradient 58/43, AV Vmax 3.8, ROBIN 0.6, mild AI   EKG: NSR, 134ms ID, left axis, 104ms QRS   - NYHA: III  - LHC: non significant CAD  - CT TAVR: 4/9/2025 reviewed by [facility] ok to proceed.   - dental clearance:  regular dentist visits q6 months, no issues.    EFT 1/5  STS  Procedure Type: Isolated AVR  Perioperative OutcomeEstimate %  Operative Mortality3.03%  KCCQ/Walk done     Group consensus recommendation:   Patient does not need repeat cardiac catheterization. Patient now meets criteria to treat. CT read: alexus 69 area 354 sinus 28  STJ 23   Coronary take off is high.  Evolut 26 FXplus. Access good. Potential MRI study. Proceed with TAVR.      Past Medical History  Medical History[1]    Surgical History  Surgical History[2]     Social History  She reports that she has never smoked. She has never used smokeless tobacco. She reports that she does not drink alcohol and does not use drugs.    Family History  Family History[3]     Allergies  Patient has no known allergies.    Review of Systems   Full 14 point ROS complete and negative except as noted above.    Physical Exam   Physical Exam:     There were no vitals filed for this visit.     General:  Alert, calm, well-developed   HEENT:  Pupils equal, round, reactive to light and accommodation. Extraocular movements   Neck:  Supple, without lymphadenopathy or thyromegaly. No carotid  "bruits.  Lymph:  No axillary, cervical, supraclavicular, pre-auricular, submental, or occipital lymphadenopathy,  Cardiovascular:  Regular rate and rhythm, with normal S1 and S2. Aortic murmurs 3/6, no rubs or gallops. No JVD. 2+ pulses bilaterally - dorsalis pedis and radial.  Lungs:  lung clear. No wheezes. No accessory muscle use or cyanosis. No tenderness to palpation.  Abdomen:  Normoactive bowel sounds. Soft, flat, non-tender, and non-distended. No hepatosplenomegaly; liver span approximately 10 cm.  Skin:  Warm, dry, well-perfused. No rashes or other lesions.  Extremities:  2+ pulses in upper and lower extremities. No lower extremity pain or edema; legs are symmetric in appearance.  Neuro:  Alert and oriented to person, place, and time. Able to communicate well. 5/5 strength in all extremities bilaterally. Sensation intact in all extremities. Normal gait.     Last Recorded Vitals  There were no vitals taken for this visit.    Relevant Results    Last I/O:  No intake/output data recorded.    Last Labs:  eGFR Cre: 55 at 4/3/2025  9:09 AM  Calculated from:  Serum Creatinine: 1.01 mg/dL at 4/3/2025  9:09 AM  Age: 84 years  Sex: Female at 4/3/2025  9:09 AM  Calculated using the CKD-EPI Creatinine Equation (2021)    CBC - 4/3/2025:  9:09 AM  5.7 11.7 200    35.0      BMP  140  106  13                  ----------------<154     4.3  27  1.01     CMP - 4/3/2025:  9:09 AM  9.4 _ _ --- _   3.3 4.3 _ _      PTT - No results in last year.  0.9   9.9 _     No results found for: \"TROPHS\", \"BNP\"     Ejection Fractions:  No results found for: \"EF\"     Assessment & Plan  Nonrheumatic aortic valve stenosis    S/P TAVR (transcatheter aortic valve replacement)      TAVR today    I spent 30 minutes in the professional and overall care of this patient.      Freddy Roque MD         [1]   Past Medical History:  Diagnosis Date    Diabetes mellitus (Multi)     Hyperlipidemia     Hypothyroid    [2]   Past Surgical " History:  Procedure Laterality Date    CATARACT EXTRACTION      ROTATOR CUFF REPAIR     [3] No family history on file.

## 2025-04-25 NOTE — Clinical Note
Temporary pacemaker turned on backup pacing. Temporary pacemaker location through right internal jugular vein. Temporary pacemaker connected to demand mode. Heart rate: 40. Current 10 (mA).

## 2025-04-25 NOTE — Clinical Note
Closure device placed in the left femoral artery. Site closed by Perclose. Deployed By: Freddy Roque MD

## 2025-04-25 NOTE — POST-PROCEDURE NOTE
Physician Transition of Care Summary  Invasive Cardiovascular Lab    Procedure Date: 4/25/2025  Attending: Panel 1:     * Avery Chaidez - Primary  Panel 2:     * Mary Gordon - Primary  Resident/Fellow/Other Assistant: Surgeons and Role:  Panel 1:     * Freddy Roque MD - Fellow    Indications:   Pre-op Diagnosis      * Nonrheumatic aortic valve stenosis [I35.0]    Post-procedure diagnosis:   Post-op Diagnosis     * Nonrheumatic aortic valve stenosis [I35.0]    Procedure(s):   TVP for TAVR    TAVR-OR    TAVR (Transcatheter AV Replacement)  43694 - MT REPLACE AORTIC VALVE PERQ FEMORAL ARTRY APPROACH    MT REPLACE AORTIC VALVE PERQ FEMORAL ARTRY APPROACH [65113]    Procedure Findings:   Acute on chronic heart failure    Description of the Procedure:   Indication: Severe Aortic Stenosis    Valve used: 26 mm Evolut FX+    Pre dil: True dilation balloon 20 mm    Access  Primary: right CFA s/p 2 proglide  Secondary: left CFA 6 Slovenian s/p 1 proglide    TVP: right IJ vein, 7 Slovenian, removed in the cath lab.     Pre LVEDP: 20  Post LVEDP: 18    Post gradient TTE: 4  No PVL  No effusion    Complications:   none    Stents/Implants:   Implants          Pacemaker          Catheter, Pacing, Pacel, Flow Directed, 5 Fr X 110 Cm - [id number] - Used, Not Implanted        Inventory item: CATHETER, PACING, PACEL, FLOW DIRECTED, 5 FR X 110 CM Model/Cat number: [id number]    : [employer] Lot number: [id number]    Device identifier: [id number] Implant Date: 4/25/2025      GUDID Information       Request status Successful        Brand name: Pacel™ Version/Model: [id number]    Company name: [employer] MRI safety info as of 4/25/25: Labeling does not contain MRI Safety Information    Contains dry or latex rubber: Yes      GMDN P.T. name: Temporary cardiac pacing balloon catheter                As of 4/25/2025       Status: Used, Not Implanted                           Heart Valve Repair           Loading System, Evolut Fx, 23-29mm - [id number] - Used, Not Implanted        Inventory item: LOADING SYSTEM, EVOLUT FX,  23-29MM Model/Cat number: [id number]    : [employer] Lot number: [id number]    Device identifier: [id number]        GUDID Information       Request status Successful        Brand name: Evolut™ FX Version/Model: [id number]-2329    Company name: [employer] MRI safety info as of 4/25/25: Labeling does not contain MRI Safety Information    Contains dry or latex rubber: No      GMDN P.T. name: Aortic transcatheter heart valve bioprosthesis, stent-like framework                As of 4/25/2025       Status: Used, Not Implanted                        Valve, Aortic, 26mm, Evolut Fx Transcatheter - [id number] - [id number] - Implanted        Inventory item: VALVE, AORTIC, 26MM, EVOLUT FX  TRANSCATHETER Model/Cat number: [id number]    Serial number: [id number] : [employer]    Lot number: [id number]        As of 4/25/2025       Status: Implanted                              Anticoagulation/Antiplatelet Plan:   aspirin    Estimated Blood Loss:   10 mL    Anesthesia: Moderate Sedation Anesthesia Staff: No anesthesia staff entered.    Any Specimen(s) Removed:   Order Name Source Comment Collection Info Order Time   CBC Blood, Venous Please have blood drawn 4-7 days after your procedure. You do NOT have to fast.  4/22/2025  2:00 AM     Release result to MyChart   Immediate        BASIC METABOLIC PANEL Blood, Venous   4/22/2025  2:00 AM     Release result to MyChart   Immediate        TYPE AND SCREEN Blood, Venous  Collected By: Coty Garibay RN 4/25/2025  9:02 AM     Release result to [facility]   Immediate        CBC Blood, Venous Pre-op diagnosis:  Nonrheumatic aortic valve stenosis [I35.0] Collected By: Avery Chaidez MD 4/25/2025 12:10 PM     Release result to MyChart   Immediate        BASIC METABOLIC PANEL Blood, Venous Pre-op  diagnosis:  Nonrheumatic aortic valve stenosis [I35.0] Collected By: Avery Chaidez MD 4/25/2025 12:10 PM     Release result to MyChart   Immediate        PROTIME-INR Blood, Venous Pre-op diagnosis:  Nonrheumatic aortic valve stenosis [I35.0] Collected By: Avery Chaidez MD 4/25/2025 12:10 PM     Release result to MyChart   Immediate        BASIC METABOLIC PANEL Blood, Venous   4/25/2025  1:31 PM     Release result to MyChart   Immediate        MAGNESIUM Blood, Venous   4/25/2025  1:31 PM     Release result to MyChart   Immediate        CBC WITH AUTO DIFFERENTIAL Blood, Venous   4/25/2025  1:31 PM     Release result to MyChart   Immediate        PROTIME-INR Blood, Venous   4/25/2025  1:31 PM     Release result to MyChart   Immediate            Disposition:   IV diuretics  Monitor tele  Monitor access sites for bleeding  TTE tomorrow  Bed rest  Structural team will continue to follow.   page structural team for any concerning clinical events.       Electronically signed by: Freddy Roque MD, 4/25/2025 3:03 PM

## 2025-04-25 NOTE — Clinical Note
Temporary pacemaker inserted. Temporary pacemaker location through right internal jugular vein. Advanced under fluoro

## 2025-04-25 NOTE — Clinical Note
Closure device placed in the right femoral artery. Site closed by Perclose. Deployed By: Freddy Roque, MDX3 total

## 2025-04-25 NOTE — PRE-SEDATION DOCUMENTATION
Patient: Zoie Obregon  MRN: [id number]    NPO guidelines met: Yes    Physical Exam    Airway  Mallampati: III     Cardiovascular    Dental    Pulmonary        Plan    ASA 3     Mild

## 2025-04-25 NOTE — OP NOTE
Date of Procedure: April 25, 2025    Pre-operative Diagnosis:   1. Symptomatic severe aortic stenosis  2. Acute on chronic heart failure    Post-Operative Diagnosis:   1. Symptomatic severe aortic stenosis  2. Acute on chronic heart failure    Procedure:   1. Transcatheter Aortic Valve Replacement (26 mm Med[employer] Evolut FX SN: [id number]) via right common femoral artery.  2. Percutaneous placement of balloon tipped pacing catheter via right internal jugular vein.  3. Left heart catheterization including left ventricular end diastolic pressure  4. Percutaneous balloon aortic valvuloplasty (20 mm True Balloon)  5. Percutaneous pre-close of the right and left common femoral artery    Surgeon:   Mary Gordon MD    Cardiologist:   Jesenia Gerardo MD    Cardiology Fellow:  Freddy Roque MD    Indications:   Zoie Obregon is an 84-year-old female who presents with symptomatic severe aortic stenosis. She has NYHA III symptoms.  An echocardiogram showed an LVEF of 50-55%, ROBIN 0.6 cm2, and mean AV gradient of 43 mmHg.  She was referred for TAVR, was discussed at the structural heart selection committee, and found to be a candidate for transcatheter treatment.     Intra-operative findings:   Severe calcification of the aortic valve leaflets.  Trileaflet valve.    Post-deployment transthoracic echocardiography showed that there was no paravalvular leak, and the mean gradient across the valve was 3 mmHg.  There was no heart block throughout the case.      Procedure Details:  After informed consent was obtained, and all questions asked and answered to the patient's satisfaction, she was brought back to the cardiology catheterization laboratory and placed on the Cath Lab table.  A timeout was performed with the correct patient, correct procedure, the correct laterality, timing and dosage of antibiotics, the availability of blood products, and all correct equipment was verified as being present prior to beginning the  case.    Ultrasound guidance was utilized to place a 7 Portuguese locking sheath in the right internal jugular vein.  This was done using a modified Seldinger technique.  A balloon tipped pacing wire was then floated through to the right ventricle and locked into place. Next, a 6 Fr sheath was placed in the left common femoral artery using a modified Seldinger technique.  A 6 Fr pigtail was placed over a wire into the non-coronary sinus. Next, attention was placed to the right common femoral artery, and using fluoroscopic guidance and a micropuncture needle, it was accessed using modified Seldinger technique.  2 Pro-Glide Perclose sutures were placed.  Next, an 14 Portuguese sheath was placed in the primary access artery under direct fluoroscopic guidance using a Supracore wire.  The patient was then systemically heparinized. A J-wire was then used to place a JR-4 catheter in the ascending aorta, a floppy tip straight wire was utilized to cross the aortic valve, and the JR-4 catheter was moved into the left ventricle.  An exchange length J-wire was then utilized through the JR-4 catheter, this was exchanged for a pigtail catheter, and hemodynamics were measured. A Safari wire was placed through the pigtail catheter into the left ventricle.  The Evolut valve was then checked to ensure that it was loaded correctly on the sheath using fluoroscopy.      The balloon was tracked over the Safari wire into the LVOT.  Rapid pacing was initiated and after decrement in ejection and blood pressure, the balloon was inflated and deflated rapidly.  Rapid pacing was then stopped and the balloon was removed from the body.    Next, the sheath was removed from the primary access artery over the wire and the delivery system and valve replaced bareback into the primary access artery.  The valve was then advanced on the delivery system through to the ascending aorta and we verified our correct projection.  The Evolut valve was then advanced  across the native aortic valve and slowly this was deployed with rapid pacing.  After the valve was deployed 80%, the depth was evaluated and deemed appropriate.  We noted that there was one dot to the left of midline and two dots to the right of midline in our cusp-overlap view.  We then continued with slow deployment of the paddles and release of the Evolut valve from the delivery system.  The nose cone was then retracted into the valve centrally and the delivery system was removed to the descending thoracic aorta.  The pigtail was then removed from behind the cage of the Evolut valve and the delivery system was recaptured. The pigtail was placed through the valve into the LV and hemodynamics were again measured.    A Supracore wire was exchanged for the delivery wire and the delivery system was then removed from the primary access artery over the Supracore wire.  The 2 previously placed Pro-Glide Perclose sutures were cinched.  The Perclose devices were locked.  Next, attention was placed to the secondary access artery and this was closed with a Perclose device.  The patient was then transferred to the floor in stable condition.     Mary Gordon MD  Cardiac Surgeon  Division of Cardiac Surgery  [facility] [place]

## 2025-04-25 NOTE — CARE PLAN
The patient's goals for the shift include      The clinical goals for the shift include pt will remain safe and on bedrest until 1615    Over the shift, the patient did not make progress toward the following goals. Barriers to progression include recent tavr. Recommendations to address these barriers include continued monitoring .

## 2025-04-26 ENCOUNTER — APPOINTMENT (OUTPATIENT)
Dept: CARDIOLOGY | Facility: HOSPITAL | Age: 85
DRG: 267 | End: 2025-04-26
Payer: MEDICARE

## 2025-04-26 ENCOUNTER — APPOINTMENT (OUTPATIENT)
Dept: CARDIOLOGY | Facility: HOSPITAL | Age: 85
End: 2025-04-26
Payer: MEDICARE

## 2025-04-26 VITALS
TEMPERATURE: 97.5 F | SYSTOLIC BLOOD PRESSURE: 136 MMHG | WEIGHT: 172.9 LBS | OXYGEN SATURATION: 94 % | DIASTOLIC BLOOD PRESSURE: 54 MMHG | BODY MASS INDEX: 31.82 KG/M2 | RESPIRATION RATE: 14 BRPM | HEIGHT: 62 IN | HEART RATE: 94 BPM

## 2025-04-26 LAB
ANION GAP SERPL CALC-SCNC: 14 MMOL/L (ref 10–20)
AORTIC VALVE MEAN GRADIENT: 6 MMHG
AORTIC VALVE PEAK VELOCITY: 1.66 M/S
AV PEAK GRADIENT: 11 MMHG
AVA (PEAK VEL): 1.89 CM2
AVA (VTI): 2.05 CM2
BASOPHILS # BLD AUTO: 0.02 X10*3/UL (ref 0–0.1)
BASOPHILS NFR BLD AUTO: 0.2 %
BUN SERPL-MCNC: 17 MG/DL (ref 6–23)
CALCIUM SERPL-MCNC: 9.1 MG/DL (ref 8.6–10.6)
CHLORIDE SERPL-SCNC: 103 MMOL/L (ref 98–107)
CO2 SERPL-SCNC: 24 MMOL/L (ref 21–32)
CREAT SERPL-MCNC: 0.96 MG/DL (ref 0.5–1.05)
EGFRCR SERPLBLD CKD-EPI 2021: 59 ML/MIN/1.73M*2
EJECTION FRACTION APICAL 4 CHAMBER: 58.2
EJECTION FRACTION: 60 %
EOSINOPHIL # BLD AUTO: 0.07 X10*3/UL (ref 0–0.4)
EOSINOPHIL NFR BLD AUTO: 0.8 %
ERYTHROCYTE [DISTWIDTH] IN BLOOD BY AUTOMATED COUNT: 13.3 % (ref 11.5–14.5)
GLUCOSE SERPL-MCNC: 180 MG/DL (ref 74–99)
HCT VFR BLD AUTO: 31.7 % (ref 36–46)
HGB BLD-MCNC: 10 G/DL (ref 12–16)
IMM GRANULOCYTES # BLD AUTO: 0.03 X10*3/UL (ref 0–0.5)
IMM GRANULOCYTES NFR BLD AUTO: 0.4 % (ref 0–0.9)
INR PPP: 1 (ref 0.9–1.1)
LEFT ATRIUM VOLUME AREA LENGTH INDEX BSA: 23.5 ML/M2
LEFT VENTRICLE INTERNAL DIMENSION DIASTOLE: 3.5 CM (ref 3.5–6)
LEFT VENTRICULAR OUTFLOW TRACT DIAMETER: 1.8 CM
LYMPHOCYTES # BLD AUTO: 0.65 X10*3/UL (ref 0.8–3)
LYMPHOCYTES NFR BLD AUTO: 7.8 %
MAGNESIUM SERPL-MCNC: 1.75 MG/DL (ref 1.6–2.4)
MCH RBC QN AUTO: 28.9 PG (ref 26–34)
MCHC RBC AUTO-ENTMCNC: 31.5 G/DL (ref 32–36)
MCV RBC AUTO: 92 FL (ref 80–100)
MITRAL VALVE E/A RATIO: 0.7
MONOCYTES # BLD AUTO: 0.76 X10*3/UL (ref 0.05–0.8)
MONOCYTES NFR BLD AUTO: 9.1 %
NEUTROPHILS # BLD AUTO: 6.78 X10*3/UL (ref 1.6–5.5)
NEUTROPHILS NFR BLD AUTO: 81.7 %
NRBC BLD-RTO: 0 /100 WBCS (ref 0–0)
PLATELET # BLD AUTO: 148 X10*3/UL (ref 150–450)
POTASSIUM SERPL-SCNC: 3.8 MMOL/L (ref 3.5–5.3)
PROTHROMBIN TIME: 11.4 SECONDS (ref 9.8–12.4)
RBC # BLD AUTO: 3.46 X10*6/UL (ref 4–5.2)
RIGHT VENTRICLE FREE WALL PEAK S': 12.8 CM/S
SODIUM SERPL-SCNC: 137 MMOL/L (ref 136–145)
TRICUSPID ANNULAR PLANE SYSTOLIC EXCURSION: 1.9 CM
WBC # BLD AUTO: 8.3 X10*3/UL (ref 4.4–11.3)

## 2025-04-26 PROCEDURE — 93325 DOPPLER ECHO COLOR FLOW MAPG: CPT

## 2025-04-26 PROCEDURE — 93010 ELECTROCARDIOGRAM REPORT: CPT | Performed by: INTERNAL MEDICINE

## 2025-04-26 PROCEDURE — 93325 DOPPLER ECHO COLOR FLOW MAPG: CPT | Performed by: INTERNAL MEDICINE

## 2025-04-26 PROCEDURE — 80048 BASIC METABOLIC PNL TOTAL CA: CPT | Performed by: NURSE PRACTITIONER

## 2025-04-26 PROCEDURE — 2500000002 HC RX 250 W HCPCS SELF ADMINISTERED DRUGS (ALT 637 FOR MEDICARE OP, ALT 636 FOR OP/ED): Performed by: NURSE PRACTITIONER

## 2025-04-26 PROCEDURE — 93005 ELECTROCARDIOGRAM TRACING: CPT

## 2025-04-26 PROCEDURE — XXE2X19 MEASUREMENT OF CARDIAC OUTPUT, COMPUTER-AIDED ASSESSMENT, NEW TECHNOLOGY GROUP 9: ICD-10-PCS | Performed by: INTERNAL MEDICINE

## 2025-04-26 PROCEDURE — 93321 DOPPLER ECHO F-UP/LMTD STD: CPT | Performed by: INTERNAL MEDICINE

## 2025-04-26 PROCEDURE — 93308 TTE F-UP OR LMTD: CPT | Performed by: INTERNAL MEDICINE

## 2025-04-26 PROCEDURE — 83735 ASSAY OF MAGNESIUM: CPT | Performed by: NURSE PRACTITIONER

## 2025-04-26 PROCEDURE — 36415 COLL VENOUS BLD VENIPUNCTURE: CPT | Performed by: NURSE PRACTITIONER

## 2025-04-26 PROCEDURE — 2500000004 HC RX 250 GENERAL PHARMACY W/ HCPCS (ALT 636 FOR OP/ED): Mod: JW | Performed by: NURSE PRACTITIONER

## 2025-04-26 PROCEDURE — 85025 COMPLETE CBC W/AUTO DIFF WBC: CPT | Performed by: NURSE PRACTITIONER

## 2025-04-26 PROCEDURE — 2500000001 HC RX 250 WO HCPCS SELF ADMINISTERED DRUGS (ALT 637 FOR MEDICARE OP): Performed by: NURSE PRACTITIONER

## 2025-04-26 PROCEDURE — 85610 PROTHROMBIN TIME: CPT | Performed by: NURSE PRACTITIONER

## 2025-04-26 RX ORDER — LOSARTAN POTASSIUM 25 MG/1
25 TABLET ORAL DAILY
Qty: 60 TABLET | Refills: 0 | Status: SHIPPED | OUTPATIENT
Start: 2025-04-27 | End: 2025-06-26

## 2025-04-26 RX ORDER — ASPIRIN 81 MG/1
81 TABLET ORAL DAILY
Qty: 90 TABLET | Refills: 11 | Status: SHIPPED | OUTPATIENT
Start: 2025-04-26

## 2025-04-26 RX ADMIN — ATORVASTATIN CALCIUM 10 MG: 10 TABLET, FILM COATED ORAL at 06:21

## 2025-04-26 RX ADMIN — LEVOTHYROXINE SODIUM 88 MCG: 0.09 TABLET ORAL at 09:32

## 2025-04-26 RX ADMIN — ACETAMINOPHEN 650 MG: 325 TABLET ORAL at 04:22

## 2025-04-26 RX ADMIN — PANTOPRAZOLE SODIUM 40 MG: 40 TABLET, DELAYED RELEASE ORAL at 06:21

## 2025-04-26 RX ADMIN — LOSARTAN POTASSIUM 25 MG: 25 TABLET, FILM COATED ORAL at 09:32

## 2025-04-26 RX ADMIN — ASPIRIN 81 MG: 81 TABLET, COATED ORAL at 09:33

## 2025-04-26 RX ADMIN — PERFLUTREN 2 ML OF DILUTION: 6.52 INJECTION, SUSPENSION INTRAVENOUS at 08:50

## 2025-04-26 ASSESSMENT — COGNITIVE AND FUNCTIONAL STATUS - GENERAL
MOBILITY SCORE: 24
DAILY ACTIVITIY SCORE: 24

## 2025-04-26 ASSESSMENT — PAIN - FUNCTIONAL ASSESSMENT
PAIN_FUNCTIONAL_ASSESSMENT: 0-10
PAIN_FUNCTIONAL_ASSESSMENT: 0-10

## 2025-04-26 ASSESSMENT — PAIN SCALES - GENERAL
PAINLEVEL_OUTOF10: 0 - NO PAIN
PAINLEVEL_OUTOF10: 3
PAINLEVEL_OUTOF10: 0 - NO PAIN

## 2025-04-26 ASSESSMENT — ACTIVITIES OF DAILY LIVING (ADL): LACK_OF_TRANSPORTATION: NO

## 2025-04-26 NOTE — PROGRESS NOTES
Reached out to patient    04/26/25 1032   Discharge Planning   Living Arrangements Children   Support Systems Children   Assistance Needed N/A   Type of Residence Private residence   Who is requesting discharge planning? Provider   Home or Post Acute Services None   Expected Discharge Disposition Home   Does the patient need discharge transport arranged? No   Financial Resource Strain   How hard is it for you to pay for the very basics like food, housing, medical care, and heating? Not hard   Housing Stability   In the last 12 months, was there a time when you were not able to pay the mortgage or rent on time? N   In the past 12 months, how many times have you moved where you were living? 0   At any time in the past 12 months, were you homeless or living in a shelter (including now)? N   Transportation Needs   In the past 12 months, has lack of transportation kept you from medical appointments or from getting medications? no   In the past 12 months, has lack of transportation kept you from meetings, work, or from getting things needed for daily living? No   Patient Choice   Patient / Family choosing to utilize agency / facility established prior to hospitalization No     Reached out to patient to introduce myself, role and discuss discharge planning.  Patient is independent in all adl's.  Patient lives with her son.  Patient denies any recent falls.  Patient denies active home care or home care needs.  Patient feel safe at home and denies any issues with making follow up appointments or getting her medications.  Patient has expressed no questions and no social work concerns.  Family will transport home.  Insurance:  [employer] Medicare  PCP:  Lorie Cui last seen in January  Pharmacy:  [facility]  Home care:  N/A  DME:  N/A

## 2025-04-26 NOTE — DISCHARGE SUMMARY
Discharge Diagnosis  Nonrheumatic aortic valve stenosis    Issues Requiring Follow-Up  1- Follow up with Structural Heart team as recommended.   2- Taking your aspirin as rx.       Hospital Course   84 y.o. female with PMH of HLD, DM, GERD presents for evaluation of severe, symptomatic aortic stenosis. Evaluated by our team 1 year ago with hemodynamic and calcium score more consistent with moderate aortic stenosis. Today patient returns with echocardiographic progression of her disease and worsening of her symptoms with worsening fatigue, SOB, GIL and occasional chest discomfort going up the stair or walking uphill. Denies overt orthopnea, PND. Has occasional dizziness.   Underwent Percutaneous balloon aortic valvuloplasty (20 mm True Balloon)  Transcatheter Aortic Valve Replacement (26 mm Medtronic Evolut FX SN: [id number]) via right common femoral artery.  Post TAVR her TTE showed no sig gradient, no PVL and no pericardial effusion.   EKG was stable QRS 92ms, no block.   Started on aspirin.   Labs were stable.   Discharged Home     Pertinent Physical Exam At Time of Discharge  Physical Exam  General: NAD  HEENT: IEOM, PERRL   Neck: No JVD or carotid bruit  Lungs: CTAB  Heart: RRR, normal S1 and S2, no loud murmurs  Abdomen: Soft, nontender, positive bowel sounds  Extremities: No edema  Neurologic: No FND  Psychiatric: Normal mood and affect     Home Medications     Medication List      START taking these medications     aspirin 81 mg EC tablet; Take 1 tablet (81 mg) by mouth once daily.   losartan 25 mg tablet; Commonly known as: Cozaar; Take 1 tablet (25 mg)   by mouth once daily.; Start taking on: April 27, 2025     CONTINUE taking these medications     atorvastatin 10 mg tablet; Commonly known as: Lipitor   famotidine 20 mg tablet; Commonly known as: Pepcid   glipizide-metformin 5-500 mg tablet; Commonly known as: Metaglip   levothyroxine 88 mcg tablet; Commonly known as: Synthroid, Levoxyl   metoprolol succinate  XL 25 mg 24 hr tablet; Commonly known as: Toprol-XL   OneTouch Verio test strips; Generic drug: blood sugar diagnostic   Vitamin D3 25 mcg (1,000 units) tablet; Generic drug: cholecalciferol       Outpatient Follow-Up  Future Appointments   Date Time Provider Department Center   5/23/2025  8:00 AM  [facility] [id number] CARD1 [id number] [facility]   4/24/2026 10:00 AM  [facility] [id number] CARD1 [id number] [address] Jennifer Lane MD

## 2025-04-28 DIAGNOSIS — Z95.2 S/P TAVR (TRANSCATHETER AORTIC VALVE REPLACEMENT): ICD-10-CM

## 2025-04-29 ENCOUNTER — APPOINTMENT (OUTPATIENT)
Dept: GENERAL RADIOLOGY | Age: 85
End: 2025-04-29
Payer: MEDICARE

## 2025-04-29 ENCOUNTER — HOSPITAL ENCOUNTER (EMERGENCY)
Age: 85
Discharge: HOME | End: 2025-04-29
Attending: EMERGENCY MEDICINE
Payer: MEDICARE

## 2025-04-29 VITALS
HEART RATE: 87 BPM | SYSTOLIC BLOOD PRESSURE: 146 MMHG | DIASTOLIC BLOOD PRESSURE: 61 MMHG | TEMPERATURE: 98.5 F | RESPIRATION RATE: 20 BRPM | OXYGEN SATURATION: 98 %

## 2025-04-29 DIAGNOSIS — R73.9 ELEVATED BLOOD SUGAR LEVEL: ICD-10-CM

## 2025-04-29 DIAGNOSIS — D64.9 ANEMIA, UNSPECIFIED TYPE: ICD-10-CM

## 2025-04-29 DIAGNOSIS — R07.9 CHEST PAIN, UNSPECIFIED TYPE: Primary | ICD-10-CM

## 2025-04-29 LAB
ALBUMIN SERPL-MCNC: 4 G/DL (ref 3.5–5.2)
ALP SERPL-CCNC: 128 U/L (ref 35–104)
ALT SERPL-CCNC: 14 U/L (ref 0–35)
ANION GAP SERPL CALCULATED.3IONS-SCNC: 13 MMOL/L (ref 7–16)
AORTIC VALVE MEAN GRADIENT: 6 MMHG
AORTIC VALVE PEAK VELOCITY: 1.66 M/S
AST SERPL-CCNC: 39 U/L (ref 0–35)
AV PEAK GRADIENT: 11 MMHG
AVA (PEAK VEL): 1.89 CM2
AVA (VTI): 2.05 CM2
BASOPHILS # BLD: 0.02 K/UL (ref 0–0.2)
BASOPHILS NFR BLD: 0 % (ref 0–2)
BILIRUB SERPL-MCNC: <0.2 MG/DL (ref 0–1.2)
BNP SERPL-MCNC: 341 PG/ML (ref 0–450)
BODY SURFACE AREA: 1.85 M2
BUN SERPL-MCNC: 13 MG/DL (ref 8–23)
CALCIUM SERPL-MCNC: 9.3 MG/DL (ref 8.8–10.2)
CHLORIDE SERPL-SCNC: 102 MMOL/L (ref 98–107)
CO2 SERPL-SCNC: 22 MMOL/L (ref 22–29)
CREAT SERPL-MCNC: 1 MG/DL (ref 0.5–1)
EJECTION FRACTION APICAL 4 CHAMBER: 58.2
EJECTION FRACTION: 60 %
EKG ATRIAL RATE: 99 BPM
EKG P AXIS: 33 DEGREES
EKG P-R INTERVAL: 180 MS
EKG Q-T INTERVAL: 354 MS
EKG QRS DURATION: 90 MS
EKG QTC CALCULATION (BAZETT): 454 MS
EKG R AXIS: -36 DEGREES
EKG T AXIS: 96 DEGREES
EKG VENTRICULAR RATE: 99 BPM
EOSINOPHIL # BLD: 0.15 K/UL (ref 0.05–0.5)
EOSINOPHILS RELATIVE PERCENT: 3 % (ref 0–6)
ERYTHROCYTE [DISTWIDTH] IN BLOOD BY AUTOMATED COUNT: 13 % (ref 11.5–15)
GFR, ESTIMATED: 57 ML/MIN/1.73M2
GLUCOSE SERPL-MCNC: 190 MG/DL (ref 74–99)
HCT VFR BLD AUTO: 34.4 % (ref 34–48)
HGB BLD-MCNC: 11.1 G/DL (ref 11.5–15.5)
IMM GRANULOCYTES # BLD AUTO: <0.03 K/UL (ref 0–0.58)
IMM GRANULOCYTES NFR BLD: 0 % (ref 0–5)
LEFT ATRIUM VOLUME AREA LENGTH INDEX BSA: 23.5 ML/M2
LEFT VENTRICLE INTERNAL DIMENSION DIASTOLE: 3.5 CM (ref 3.5–6)
LEFT VENTRICULAR OUTFLOW TRACT DIAMETER: 1.8 CM
LYMPHOCYTES NFR BLD: 0.7 K/UL (ref 1.5–4)
LYMPHOCYTES RELATIVE PERCENT: 14 % (ref 20–42)
MCH RBC QN AUTO: 29.6 PG (ref 26–35)
MCHC RBC AUTO-ENTMCNC: 32.3 G/DL (ref 32–34.5)
MCV RBC AUTO: 91.7 FL (ref 80–99.9)
MITRAL VALVE E/A RATIO: 0.7
MONOCYTES NFR BLD: 0.41 K/UL (ref 0.1–0.95)
MONOCYTES NFR BLD: 8 % (ref 2–12)
NEUTROPHILS NFR BLD: 75 % (ref 43–80)
NEUTS SEG NFR BLD: 3.88 K/UL (ref 1.8–7.3)
PLATELET # BLD AUTO: 194 K/UL (ref 130–450)
PMV BLD AUTO: 10.7 FL (ref 7–12)
POTASSIUM SERPL-SCNC: 5.1 MMOL/L (ref 3.5–5.1)
PROT SERPL-MCNC: 6.4 G/DL (ref 6.4–8.3)
RBC # BLD AUTO: 3.75 M/UL (ref 3.5–5.5)
RIGHT VENTRICLE FREE WALL PEAK S': 12.8 CM/S
SODIUM SERPL-SCNC: 137 MMOL/L (ref 136–145)
TRICUSPID ANNULAR PLANE SYSTOLIC EXCURSION: 1.9 CM
TROPONIN I SERPL HS-MCNC: 41 NG/L (ref 0–14)
TROPONIN I SERPL HS-MCNC: 41 NG/L (ref 0–14)
WBC OTHER # BLD: 5.2 K/UL (ref 4.5–11.5)

## 2025-04-29 PROCEDURE — 36415 COLL VENOUS BLD VENIPUNCTURE: CPT

## 2025-04-29 PROCEDURE — 84484 ASSAY OF TROPONIN QUANT: CPT

## 2025-04-29 PROCEDURE — 80053 COMPREHEN METABOLIC PANEL: CPT

## 2025-04-29 PROCEDURE — 71045 X-RAY EXAM CHEST 1 VIEW: CPT

## 2025-04-29 PROCEDURE — 93005 ELECTROCARDIOGRAM TRACING: CPT | Performed by: EMERGENCY MEDICINE

## 2025-04-29 PROCEDURE — 99285 EMERGENCY DEPT VISIT HI MDM: CPT

## 2025-04-29 PROCEDURE — 93010 ELECTROCARDIOGRAM REPORT: CPT | Performed by: INTERNAL MEDICINE

## 2025-04-29 PROCEDURE — 85025 COMPLETE CBC W/AUTO DIFF WBC: CPT

## 2025-04-29 PROCEDURE — 83880 ASSAY OF NATRIURETIC PEPTIDE: CPT

## 2025-04-29 RX ORDER — FAMOTIDINE 20 MG/1
20 TABLET, FILM COATED ORAL DAILY
COMMUNITY

## 2025-04-29 RX ORDER — ASPIRIN 81 MG/1
81 TABLET ORAL DAILY
COMMUNITY
Start: 2025-04-26

## 2025-04-29 RX ORDER — METOPROLOL SUCCINATE 25 MG/1
25 TABLET, EXTENDED RELEASE ORAL DAILY
COMMUNITY
Start: 2025-01-12

## 2025-04-29 RX ORDER — ATORVASTATIN CALCIUM 10 MG/1
10 TABLET, FILM COATED ORAL DAILY
COMMUNITY
Start: 2025-03-02

## 2025-04-29 RX ORDER — LEVOTHYROXINE SODIUM 88 UG/1
88 TABLET ORAL DAILY
COMMUNITY

## 2025-04-29 RX ORDER — GLIPIZIDE AND METFORMIN HCL 5; 500 MG/1; MG/1
1 TABLET, FILM COATED ORAL 3 TIMES DAILY
COMMUNITY

## 2025-04-29 NOTE — DISCHARGE INSTRUCTIONS
Follow-up with your doctor for reevaluation and further monitoring of your anemia and hyperglycemia.

## 2025-04-29 NOTE — ED PROVIDER NOTES
11.5 k/uL    RBC 3.75 3.50 - 5.50 m/uL    Hemoglobin 11.1 (L) 11.5 - 15.5 g/dL    Hematocrit 34.4 34.0 - 48.0 %    MCV 91.7 80.0 - 99.9 fL    MCH 29.6 26.0 - 35.0 pg    MCHC 32.3 32.0 - 34.5 g/dL    RDW 13.0 11.5 - 15.0 %    Platelets 194 130 - 450 k/uL    MPV 10.7 7.0 - 12.0 fL    Neutrophils % 75 43.0 - 80.0 %    Lymphocytes % 14 (L) 20.0 - 42.0 %    Monocytes % 8 2.0 - 12.0 %    Eosinophils % 3 0 - 6 %    Basophils % 0 0.0 - 2.0 %    Immature Granulocytes % 0 0.0 - 5.0 %    Neutrophils Absolute 3.88 1.80 - 7.30 k/uL    Lymphocytes Absolute 0.70 (L) 1.50 - 4.00 k/uL    Monocytes Absolute 0.41 0.10 - 0.95 k/uL    Eosinophils Absolute 0.15 0.05 - 0.50 k/uL    Basophils Absolute 0.02 0.00 - 0.20 k/uL    Immature Granulocytes Absolute <0.03 0.00 - 0.58 k/uL   CMP   Result Value Ref Range    Sodium 137 136 - 145 mmol/L    Potassium 5.1 3.5 - 5.1 mmol/L    Chloride 102 98 - 107 mmol/L    CO2 22 22 - 29 mmol/L    Anion Gap 13 7 - 16 mmol/L    Glucose 190 (H) 74 - 99 mg/dL    BUN 13 8 - 23 mg/dL    Creatinine 1.0 0.5 - 1.0 mg/dL    Est, Glom Filt Rate 57 (L) >60 mL/min/1.73m2    Calcium 9.3 8.8 - 10.2 mg/dL    Total Protein 6.4 6.4 - 8.3 g/dL    Albumin 4.0 3.5 - 5.2 g/dL    Total Bilirubin <0.2 0.0 - 1.2 mg/dL    Alkaline Phosphatase 128 (H) 35 - 104 U/L    ALT 14 0 - 35 U/L    AST 39 (H) 0 - 35 U/L   Troponin   Result Value Ref Range    Troponin, High Sensitivity 41 (H) 0 - 14 ng/L   Troponin   Result Value Ref Range    Troponin, High Sensitivity 41 (H) 0 - 14 ng/L       RADIOLOGY:  Interpreted by Radiologist.  XR CHEST PORTABLE   Final Result   No acute process.             ------------------------- NURSING NOTES AND VITALS REVIEWED ---------------------------   The nursing notes within the ED encounter and vital signs as below have been reviewed.   BP (!) 146/61   Pulse 87   Temp 98.5 °F (36.9 °C) (Oral)   Resp 20   SpO2 98%   Oxygen Saturation Interpretation:

## 2025-04-30 LAB
ATRIAL RATE: 98 BPM
P AXIS: 36 DEGREES
P OFFSET: 194 MS
P ONSET: 146 MS
PR INTERVAL: 142 MS
Q ONSET: 217 MS
QRS COUNT: 16 BEATS
QRS DURATION: 80 MS
QT INTERVAL: 382 MS
QTC CALCULATION(BAZETT): 487 MS
QTC FREDERICIA: 450 MS
R AXIS: -34 DEGREES
T AXIS: 105 DEGREES
T OFFSET: 408 MS
VENTRICULAR RATE: 98 BPM

## 2025-05-03 LAB
ATRIAL RATE: 84 BPM
ATRIAL RATE: 84 BPM
P AXIS: 28 DEGREES
P AXIS: 33 DEGREES
P OFFSET: 184 MS
P OFFSET: 198 MS
P ONSET: 133 MS
P ONSET: 150 MS
PR INTERVAL: 138 MS
PR INTERVAL: 164 MS
Q ONSET: 215 MS
Q ONSET: 219 MS
QRS COUNT: 13 BEATS
QRS COUNT: 14 BEATS
QRS DURATION: 74 MS
QRS DURATION: 92 MS
QT INTERVAL: 378 MS
QT INTERVAL: 390 MS
QTC CALCULATION(BAZETT): 446 MS
QTC CALCULATION(BAZETT): 460 MS
QTC FREDERICIA: 422 MS
QTC FREDERICIA: 436 MS
R AXIS: -35 DEGREES
R AXIS: -35 DEGREES
T AXIS: 111 DEGREES
T AXIS: 75 DEGREES
T OFFSET: 408 MS
T OFFSET: 410 MS
VENTRICULAR RATE: 84 BPM
VENTRICULAR RATE: 84 BPM

## 2025-05-04 NOTE — DOCUMENTATION CLARIFICATION NOTE
"    PATIENT:               HARRY HARRINGTON  ACCT #:                  [id number]  MRN:                       [id number]  :                       1940  ADMIT DATE:       2025 7:50 AM  DISCH DATE:        2025 1:10 PM  RESPONDING PROVIDER #:        [id number]          PROVIDER RESPONSE TEXT:    Symptomatic valve dysfunction without heart failure    CDI QUERY TEXT:    Clarification    Instruction:    Based on your assessment of the patient and the clinical information, please provide the requested documentation by clicking on the appropriate radio button and enter any additional information if prompted.    Question: Please further clarify the type and acuity of congestive heart failure    When answering this query, please exercise your independent professional judgment. The fact that a question is being asked, does not imply that any particular answer is desired or expected.    The patient's clinical indicators include:  Clinical Information:   DCS: 84 YOF presents for severe symptomatic aortic stenosis.   underwent \"Percutaneous balloon aortic valvuloplasty\"  Transcatheter Aortic Valve Replacement\"    Clinical Indicators:   HP \"returns with echocardiographic progression of her disease and worsening of her symptoms\"  \"No lower extremity pain or edema\", \"No JVD\". \"lungs clear\"     Op Note Post op Dx: \"1. Symptomatic severe aortic stenosis\", \"2. Acute on chronic heart failure\"  \"NYHA III symptoms\", \"echocardiogram showed an LVEF of 50-55% \"     Cardiac Cath Procedure Indications \"TAVR in a high surgical risk patient with chronic diastolic and systolic heart failure. Severe aortic stenosis\"     TTE \"1.The left ventricular systolic function is normal, with a Stanton's biplane calculated ejection fraction of 59%.  2.Left ventricular diastolic filling is indeterminate.  3.Left ventricular cavity size is decreased.  4.There is moderately increased septal thickness.  5.Unable to determine right " "ventricular systolic function.  6.There is moderate mitral annular calcification.  7.Moderate to severe aortic valve stenosis.\"  \"analysis of the apical 4-chamber echocardiogram suggests the presence of heart failure with preserved ejection fraction (HFpEF) \"    4/26 TTE \"1.Left ventricular ejection fraction is normal, calculated by Stanton's biplane at 60%.  2.Spectral Doppler shows a Grade II (pseudonormal pattern) of left ventricular diastolic filling with an elevated left atrial pressure.  3.Left ventricular cavity size is decreased.  4.There is normal right ventricular global systolic function.  5.The left atrium is enlarged.  6.There is moderate mitral annular calcification.  7. 4/25/2025 S/P TAVR, 26 mm Evolut Fx+, 6 and 11 mm Hg gradient, well seated.\"  \"analysis of the apical 4-chamber echocardiogram suggests the presence of heart failure with preserved ejection fraction (HFpEF) \"    Treatment:  4/25 TAVR surgery  Monitor echocardiograms    Risk Factors:  Elderly  AS  Worsening fatigue, SOB, GIL  Options provided:  -- Acute combined systolic/diastolic Congestive Heart Failure  -- Chronic combined systolic/diastolic Congestive Heart Failure  -- Acute on Chronic combined systolic/diastolic Congestive Heart Failure  -- Acute Diastolic Congestive Heart Failure  -- Chronic Diastolic Congestive Heart Failure  -- Acute on Chronic Diastolic Congestive Heart Failure  -- Symptomatic valve dysfunction without heart failure  -- Other - I will add my own diagnosis  -- Refer to Clinical Documentation Reviewer    Query created by: Altagracia Molina on 5/2/2025 10:49 AM      Electronically signed by:  CAITLIN VAZ MD 5/4/2025 8:22 AM          "

## 2025-05-13 LAB — ACT BLD: 295 SEC (ref 89–169)

## 2025-05-23 ENCOUNTER — TELEMEDICINE (OUTPATIENT)
Dept: CARDIOLOGY | Facility: HOSPITAL | Age: 85
End: 2025-05-23
Payer: MEDICARE

## 2025-05-23 DIAGNOSIS — Z79.4 TYPE 2 DIABETES MELLITUS WITH DIABETIC NEPHROPATHY, WITH LONG-TERM CURRENT USE OF INSULIN: ICD-10-CM

## 2025-05-23 DIAGNOSIS — E11.21 TYPE 2 DIABETES MELLITUS WITH DIABETIC NEPHROPATHY, WITH LONG-TERM CURRENT USE OF INSULIN: ICD-10-CM

## 2025-05-23 DIAGNOSIS — N18.31 CHRONIC KIDNEY DISEASE, STAGE 3A (MULTI): ICD-10-CM

## 2025-05-23 DIAGNOSIS — Z95.2 S/P TAVR (TRANSCATHETER AORTIC VALVE REPLACEMENT): Primary | ICD-10-CM

## 2025-05-23 PROCEDURE — 99213 OFFICE O/P EST LOW 20 MIN: CPT | Performed by: NURSE PRACTITIONER

## 2025-05-23 PROCEDURE — 1159F MED LIST DOCD IN RCRD: CPT | Performed by: NURSE PRACTITIONER

## 2025-05-23 PROCEDURE — 1160F RVW MEDS BY RX/DR IN RCRD: CPT | Performed by: NURSE PRACTITIONER

## 2025-05-23 PROCEDURE — 1036F TOBACCO NON-USER: CPT | Performed by: NURSE PRACTITIONER

## 2025-05-23 PROCEDURE — 1111F DSCHRG MED/CURRENT MED MERGE: CPT | Performed by: NURSE PRACTITIONER

## 2025-05-23 RX ORDER — LOSARTAN POTASSIUM 25 MG/1
25 TABLET ORAL DAILY
Qty: 90 TABLET | Refills: 3 | Status: SHIPPED | OUTPATIENT
Start: 2025-05-23 | End: 2026-05-23

## 2025-05-23 NOTE — PROGRESS NOTES
Structural Heart Follow up visit      Zoie Obregon is a  84 y.o. female with PMH of HLD, DM, GERD presents for evaluation of severe, symptomatic aortic stenosi S/p Evolut FX+ 26mm via RFP presents for 1 mo follow up      denies SOB,GIL, fatigue        No results found for this or any previous visit (from the past 96 hours).     Transthoracic Echo (TTE) Limited  Result Date: 4/29/2025   [facility] (formerly [facility]), [address] [place] [address]                Tel [phone] and Fax [phone] TRANSTHORACIC ECHOCARDIOGRAM REPORT  Patient Name:       ZOIE OBREGON       Reading Physician:    [id number] Viet Amato MD Study Date:         4/26/2025           Ordering Provider:    [id number] ARVIN BALTAZAR MRN/PID:            [id number]            Fellow: Accession#:         [id number]        Nurse: Date of Birth/Age:  1940 / 84      Sonographer:          Allie najera                                     RDYAIR Gender assigned at  F                   Additional Staff: Birth: Height:             157.48 cm           Admit Date: Weight:             78.02 kg            Admission Status:     Inpatient -                                                               Routine BSA / BMI:          1.79 m2 / 31.46     Encounter#:           [id number]                     kg/m2 Blood Pressure:     136/54 mmHg         Department Location:  [facility]                                                               Non Invasive Study Type:    TRANSTHORACIC ECHO (TTE) LIMITED Diagnosis/ICD: Presence of prosthetic heart valve-Z95.2 Indication:    s/p TAVR CPT Code:      Echo Limited-07799; Doppler Limited-01087; Color Doppler-57173 Patient History: Pertinent History: S/p TAVR 26mm Evolt FX+ 2/25/2025, HLD, DM, GERD. Study Detail: The following Echo studies were  performed: 2D, M-Mode, Doppler and               color flow. Definity used as a contrast agent for endocardial               border definition. Total contrast used for this procedure was 2 mL               via IV push.  PHYSICIAN INTERPRETATION: Left Ventricle: Left ventricular ejection fraction is normal, calculated by Stanton's biplane at 60%. There are no regional left ventricular wall motion abnormalities. The left ventricular cavity size is decreased. There is mildly increased septal and normal posterior left ventricular wall thickness. There is left ventricular concentric remodeling. Spectral Doppler shows a Grade II (pseudonormal pattern) of left ventricular diastolic filling with an elevated left atrial pressure. Left Atrium: The left atrium is enlarged. Right Ventricle: The right ventricle is normal in size. There is normal right ventricular global systolic function. Right Atrium: The right atrial size was not well visualized. Aortic Valve: There is a prosthetic aortic valve present. The aortic valve dimensionless index is 0.80. There is no evidence of aortic valve regurgitation. The peak instantaneous gradient of the aortic valve is 11 mmHg. The mean gradient of the aortic valve is 6 mmHg. 4/25/2025 S/P TAVR, 26 mm Evolut Fx+, 6 and 11 mm Hg gradient, well seated. Mitral Valve: The mitral valve is mildly thickened. There is moderate mitral annular calcification. The peak instantaneous gradient of the mitral valve is 7 mmHg. There is trace mitral valve regurgitation. Tricuspid Valve: The tricuspid valve is structurally normal. Tricuspid regurgitation was not assessed. Pulmonic Valve: The pulmonic valve was not assessed. Pulmonic valve regurgitation was not assessed. Pericardium: Trivial pericardial effusion. Aorta: The aortic root is normal.  CONCLUSIONS:  1. Left ventricular ejection fraction is normal, calculated by Stanton's biplane at 60%.  2. Spectral Doppler shows a Grade II (pseudonormal pattern) of  left ventricular diastolic filling with an elevated left atrial pressure.  3. Left ventricular cavity size is decreased.  4. There is normal right ventricular global systolic function.  5. The left atrium is enlarged.  6. There is moderate mitral annular calcification.  7. 4/25/2025 S/P TAVR, 26 mm Evolut Fx+, 6 and 11 mm Hg gradient, well seated. RECOMMENDATIONS: Utilizing an FDA cleared automated machine learning algorithm (EchoGo Heart Failure by [employer]), the analysis of the apical 4-chamber echocardiogram suggests the presence of heart failure with preserved ejection fraction (HFpEF)*. Clinical correlation looking for additional heart failure signs and symptoms is recommended, as a definite diagnosis of heart failure cannot be made by imaging alone. *Per ACC/AHA/HFSA universal diagnosis of heart failure, HFpEF is defined as 1) signs and symptoms leading to clinical diagnosis of heart failure, 2) an ejection fraction of at least 50%, and 3) evidence of elevated intra-cardiac filling pressures by echocardiography, BNP elevation, or catheterization.  QUANTITATIVE DATA SUMMARY:  2D MEASUREMENTS:          Normal Ranges: LAs:             4.20 cm  (2.7-4.0cm) IVSd:            1.20 cm  (0.6-1.1cm) LVPWd:           0.80 cm  (0.6-1.1cm) LVIDd:           3.50 cm  (3.9-5.9cm) LVIDs:           2.80 cm LV Mass Index:   58 g/m2 LVEDV Index:     36 ml/m2 LV % FS          20.0 %  LEFT ATRIUM:                  Normal Ranges: LA Vol A4C:        53.9 ml    (22+/-6mL/m2) LA Vol A2C:        32.0 ml LA Vol BP:         42.1 ml LA Vol Index A4C:  30.1ml/m2 LA Vol Index A2C:  17.9 ml/m2 LA Vol Index BP:   23.5 ml/m2 LA Area A4C:       18.4 cm2 LA Area A2C:       14.0 cm2 LA Major Axis A4C: 5.3 cm LA Major Axis A2C: 5.2 cm  LV SYSTOLIC FUNCTION:                      Normal Ranges: EF-A4C View:    58 % (>=55%) EF-A2C View:    61 % EF-Biplane:     60 % LV EF Reported: 60 %  LV DIASTOLIC FUNCTION:             Normal Ranges: MV Peak E:              0.95 m/s    (0.7-1.2 m/s) MV Peak A:             1.36 m/s    (0.42-0.7 m/s) E/A Ratio:             0.70        (1.0-2.2) MV e'                  0.065 m/s   (>8.0) MV lateral e'          0.09 m/s MV medial e'           0.04 m/s MV A Dur:              129.00 msec E/e' Ratio:            14.59       (<8.0)  MITRAL VALVE:          Normal Ranges: MV Vmax:      1.35 m/s (<=1.3m/s) MV peak P.3 mmHg (<5mmHg) MV mean P.0 mmHg (<2mmHg) MV DT:        192 msec (150-240msec)  AORTIC VALVE:                      Normal Ranges: AoV Vmax:                1.66 m/s  (<=1.7m/s) AoV Peak P.0 mmHg (<20mmHg) AoV Mean P.0 mmHg  (1.7-11.5mmHg) LVOT Max Munir:            1.23 m/s  (<=1.1m/s) AoV VTI:                 30.60 cm  (18-25cm) LVOT VTI:                24.60 cm LVOT Diameter:           1.80 cm   (1.8-2.4cm) AoV Area, VTI:           2.05 cm2  (2.5-5.5cm2) AoV Area,Vmax:           1.89 cm2  (2.5-4.5cm2) AoV Dimensionless Index: 0.80  RIGHT VENTRICLE: TAPSE: 18.7 mm RV s'  0.13 m/s  [id number] Viet Amato MD Electronically signed on 2025 at 10:06:15 AM  ** Final **     Transthoracic Echo (TTE) Limited  Result Date: 2025   [facility], [address] [place] [address]                Tel [phone] and Fax [phone] TRANSTHORACIC ECHOCARDIOGRAM REPORT  Patient Name:       HARRY Hilliard Physician:    [id number] Armen Taylor MD Study Date:         2025           Ordering Provider:    [id number] ARVIN BALTAZAR MRN/PID:            [id number]            Fellow: Accession#:         [id number]        Nurse: Date of Birth/Age:  1940      Sonographer:          Chasity najera                                     RDCS Gender assigned at  F                   Additional Staff: Birth: Height:              157.48 cm           Admit Date:           4/25/2025 Weight:             77.11 kg            Admission Status:     Inpatient -                                                               Routine BSA / BMI:          1.78 m2 / 31.09     Encounter#:           [id number]                     kg/m2 Blood Pressure:     131/66 mmHg         Department Location:  [facility]                                                               Cath Lab Study Type:    TRANSTHORACIC ECHO (TTE) LIMITED Diagnosis/ICD: Nonrheumatic aortic (valve) stenosis-I35.0 Indication:    TAVR periprocedure CPT Code:      Echo Limited-54006; Doppler Limited-91942; Color Doppler-95761 Patient History: Pertinent History: HLD, DM, GERD presents for evaluation of severe, symptomatic                    aortic stenosis. Study Detail: The following Echo studies were performed: 2D, M-Mode, Doppler and               color flow.  PHYSICIAN INTERPRETATION: Left Ventricle: The left ventricular systolic function is normal, with a Stanton's biplane calculated ejection fraction of 59%. There are no regional left ventricular wall motion abnormalities. The left ventricular cavity size is decreased. There is moderately increased septal and normal posterior left ventricular wall thickness. There is left ventricular concentric remodeling. Left ventricular diastolic filling is indeterminate. Left Atrium: The left atrial size is normal. Right Ventricle: The right ventricle is normal in size. Unable to determine right ventricular systolic function. Right Atrium: The right atrium is normal in size. Aortic Valve: The aortic valve was not well visualized. There is evidence of moderate to severe aortic valve stenosis. There are increased aortic valve velocities due to increased flow/dynamic ejection. The aortic valve dimensionless index is 0.21. There is no evidence of aortic valve regurgitation. The peak instantaneous gradient of the aortic valve is 65 mmHg. The mean  gradient of the aortic valve is 39 mmHg. Mitral Valve: The mitral valve is mildly thickened. There is moderate mitral annular calcification. There is mild mitral valve regurgitation. Tricuspid Valve: The tricuspid valve is structurally normal. Tricuspid regurgitation was not assessed. Pulmonic Valve: The pulmonic valve was not assessed. Pulmonic valve regurgitation was not assessed. Pericardium: Trivial pericardial effusion. Aorta: The aortic root is normal. Systemic Veins: The inferior vena cava appears normal in size, with IVC inspiratory collapse greater than 50%. In comparison to the previous echocardiogram(s): There are no prior studies on this patient for comparison purposes.  Post Transcatheter Aortic Valve Placement (TAVR): The peak instantaneous gradient of the aortic valve is 8.0 mmHg. The mean gradient of the aortic valve is 3.0 mmHg. There is no alexus-prosthetic aortic valve regurgitation. There is a trivial pericardial effusion.  CONCLUSIONS:  1. The left ventricular systolic function is normal, with a Stanton's biplane calculated ejection fraction of 59%.  2. Left ventricular diastolic filling is indeterminate.  3. Left ventricular cavity size is decreased.  4. There is moderately increased septal thickness.  5. Unable to determine right ventricular systolic function.  6. There is moderate mitral annular calcification.  7. Moderate to severe aortic valve stenosis. RECOMMENDATIONS: Utilizing an FDA cleared automated machine learning algorithm (EchoGo Heart Failure by [email or web address]), the analysis of the apical 4-chamber echocardiogram suggests the presence of heart failure with preserved ejection fraction (HFpEF)*. Clinical correlation looking for additional heart failure signs and symptoms is recommended, as a definite diagnosis of heart failure cannot be made by imaging alone. *Per ACC/AHA/HFSA universal diagnosis of heart failure, HFpEF is defined as 1) signs and symptoms leading to clinical diagnosis of  heart failure, 2) an ejection fraction of at least 50%, and 3) evidence of elevated intra-cardiac filling pressures by echocardiography, BNP elevation, or catheterization.  QUANTITATIVE DATA SUMMARY:  2D MEASUREMENTS:          Normal Ranges: Ao Root d:       2.60 cm  (2.0-3.7cm) LAs:             3.90 cm  (2.7-4.0cm) IVSd:            1.30 cm  (0.6-1.1cm) LVPWd:           0.80 cm  (0.6-1.1cm) LVIDd:           3.20 cm  (3.9-5.9cm) LVIDs:           2.00 cm LV Mass Index:   54 g/m2 LVEDV Index:     35 ml/m2 LV % FS          37.5 %  LEFT ATRIUM:                Normal Ranges: LA Volume Index: 20.9 ml/m2  RIGHT ATRIUM:          Normal Ranges: RA Area A4C:  14.3 cm2  LV SYSTOLIC FUNCTION:                      Normal Ranges: EF-A4C View:    56 % (>=55%) EF-A2C View:    62 % EF-Biplane:     59 % LV EF Reported: 59 %  AORTIC VALVE:                                Normal Ranges: AoV Vmax:                          4.03 m/s  (<=1.7m/s) AoV Vmax Post TAVR:                1.41 m/s  (<=1.7m/s) AoV Peak P.0 mmHg (<20mmHg) AoV Peak PG Post TAVR:             8.0 mmHg  (<20mmHg) AoV Mean P.0 mmHg (1.7-11.5mmHg) AoV Mean PG Post TAVR:             3.0 mmHg  (1.7-11.5mmHg) LVOT Max Munir:                      0.83 m/s  (<=1.1m/s) LVOT Max Munir Post TAVR:            1.28 m/s  (<=1.1m/s) AoV VTI:                           104.00 cm (18-25cm) AoV VTI Post TAVR:                 28.80 cm  (18-25cm) LVOT VTI:                          21.70 cm LVOT VTI Post TAVR:                28.10 cm LVOT Diameter:                     1.83 cm   (1.8-2.4cm) LVOT Diameter Post TAVR:           1.83 cm   (1.8-2.4cm) AoV Area, VTI:                     0.55 cm2  (2.5-5.5cm2) AoV Area, VTI Post TAVR:           2.57 cm2  (2.5-5.5cm2) AoV Area,Vmax:                     0.54 cm2  (2.5-4.5cm2) AoV Area,Vmax Post TAVR:           2.39 cm2  (2.5-4.5cm2) AoV Dimensionless Index:           0.21 AoV Dimensionless Index Post  TAVR: 0.98  RIGHT VENTRICLE: RV Basal 3.20 cm RV Mid   2.10 cm RV Major 5.5 cm  [id number] Armen Taylor MD Electronically signed on 4/25/2025 at 2:08:35 PM  ** Final **             Heart Failure Follow up    NYHA class 1    Edema Denies  Dyspnea on Exertion Denies  Fatigue Improved  Exercise Intolerance Denies  Orthopnea Denies  PND Denies    Chest pain No  Syncope No  Palpitations No  Weight gain No  Weight loss No                 [facility]Q Questionnaire      1  Heart failure affects different people in different ways. Some feel shortness of breath while others feel fatigue. Please indicate how much you are limited by heart failure (shortness of breath or fatigue) in your ability to do the following activities over the past 2 weeks.     A.) Showering/bathing  5. Not at All  B.) Walking 1 block on level ground 5. Not at All  C.) Hurrying or Jogging   5. Not at All    2.  Over the past 2 weeks, how many times did you have swelling in your feet, ankles or legs when you woke up in the morning? 5. Never    3.  Over the past 2 weeks, on average, how many times has fatigue limited your ability to do what you wanted? 7. Never    4.  Over the past 2 weeks, on average, how many times has shortness of breath limited your ability to do what you wanted? 7. Never    5.  Over the past 2 weeks, on average, how many times have you been forced to sleep sitting up in a chair or with at least 3 pillows to prop you up because of shortness of breath? Never    6. Over the past 2 weeks, how much has your heart failure limited your enjoyment of life? It has not limited my enjoyment of life    7. If you had to spend the rest of your life with your heart failure the way it is right now, how would you feel about this? 4. Mostly satisfied    8. How much does your heart failure affect your lifestyle? Please indicate how your heart failure may have limited yourparticipation in the following activities over the past 2 weeks    A.)  Hobbies, recreational  "activities  5. Did not limit at all    B.) Working or doing household chores  5. Did not limit at all    C.) Visiting family or friends out of your home  5. Did not limit at all    Impression  Doing well clinically, appears euvolemic and denies any lower extremity edema.  Bilat groins have healed, states she has some \"pinching\" at the r groin site. Explained that it is likely the closure device.  -1302/60-70s with HR in 70s    Labs reviewed and significant for hyperglycemia (non-fasting) and anemia.     Plan:  - Cont Losartan 25mg and Metoprolol XL 25mg daily  - Cont ASA for life  - Cont to increase activity as tolerated   - f/u with PCP and Cards as directed  - Life-long Dental SBE prophylaxis needed      Post procedure (complete) echo requirements per TVT registry    -1 month echo to be done 23-75 days post implant  -1 year echo to be done 305-425 days post implant   - Annually after implant    Virtual or Telephone Consent    An interactive audio and video telecommunication system which permits real time communications between the patient (at the originating site) and provider (at the distant site) was utilized to provide this telehealth service.   Verbal consent was requested and obtained from Zoie Obregon on this date, 05/23/25 for a telehealth visit and the patient's location was confirmed at the time of the visit.            "

## (undated) DEVICE — CATHETER, DIAGNOSTIC, 6 FR INFINITI, PIG

## (undated) DEVICE — GUIDEWIRE, SAFARI 2, .035 X 275CM, EXTRA SMALL CURVE, PRE-SHAPED

## (undated) DEVICE — GUIDEWIRE, INQWIRE, .035, 150CM, STRT TIP

## (undated) DEVICE — CATHETER, BALLOON DILATION, LOMA VISTA, 20MM X 4.5CM

## (undated) DEVICE — GUIDEWIRE, INQWIRE, 3MM J, .035, 150

## (undated) DEVICE — DELIVERY SYSTEM, EVOLUT FX, 23-29MM

## (undated) DEVICE — CATHETER, DIAGNOSTIC, DXTERITY, 6FR JR 4.0, 100CM

## (undated) DEVICE — INTRODUCER SHEATH, SENTRANT ENSURE SEAL 14FR 28CM

## (undated) DEVICE — INTRODUCER, PINNACLE, 6 FR, 10 CM

## (undated) DEVICE — DEVICE, CLOSURE, PERCLOSE, PROSTYLE

## (undated) DEVICE — CATHETER, ANGIO, IMPULSE, PIG 155, 6 FR X 110 CM

## (undated) DEVICE — CABLE, PACING PATIENT BIPOLAR 8'

## (undated) DEVICE — ACCESS KIT, S-MAK MINI, 4FR 10CM 0.018IN 40CM, NT/PT, ECHO ENHANCE NEEDLE

## (undated) DEVICE — INTRODUCER SET, FAST CATHETER, HEMOSTASIS, 7 FR X 12CM, W/LUER LOCK, SIDEPORT

## (undated) DEVICE — GUIDEWIRE, INQWIRE, 3MM J, .035, 260

## (undated) DEVICE — PAD, ELECTRODE DEFIB PADPRO ADULT STRL W/ADAPTER

## (undated) DEVICE — GUIDE WIRE, 035/190CM, HI-TORGUE SUPRA CORE

## (undated) DEVICE — SHEATH, PINNACLE, 10 CM,  7FR INTRODUCER, 7FR DIA, 2.5 CM DIALATOR

## (undated) DEVICE — ACCESS KIT, S-MAK MINI, 4FR 10CM 0.018IN 40CM, SS/SS, ECHO ENHANCE NEEDLE